# Patient Record
Sex: MALE | Race: WHITE | HISPANIC OR LATINO | Employment: OTHER | URBAN - METROPOLITAN AREA
[De-identification: names, ages, dates, MRNs, and addresses within clinical notes are randomized per-mention and may not be internally consistent; named-entity substitution may affect disease eponyms.]

---

## 2017-01-02 RX ORDER — LEVOTHYROXINE SODIUM 88 UG/1
TABLET ORAL
Qty: 30 TABLET | Refills: 12 | Status: SHIPPED | OUTPATIENT
Start: 2017-01-02 | End: 2017-05-16

## 2017-01-30 ENCOUNTER — OFFICE VISIT (OUTPATIENT)
Dept: OTOLARYNGOLOGY | Facility: CLINIC | Age: 77
End: 2017-01-30
Payer: MEDICARE

## 2017-01-30 VITALS
WEIGHT: 226.63 LBS | DIASTOLIC BLOOD PRESSURE: 79 MMHG | SYSTOLIC BLOOD PRESSURE: 134 MMHG | TEMPERATURE: 98 F | BODY MASS INDEX: 33.96 KG/M2 | HEART RATE: 72 BPM

## 2017-01-30 DIAGNOSIS — R04.0 RECURRENT EPISTAXIS: Primary | ICD-10-CM

## 2017-01-30 PROCEDURE — 99214 OFFICE O/P EST MOD 30 MIN: CPT | Mod: S$GLB,,, | Performed by: OTOLARYNGOLOGY

## 2017-01-30 PROCEDURE — 3078F DIAST BP <80 MM HG: CPT | Mod: S$GLB,,, | Performed by: OTOLARYNGOLOGY

## 2017-01-30 PROCEDURE — 3075F SYST BP GE 130 - 139MM HG: CPT | Mod: S$GLB,,, | Performed by: OTOLARYNGOLOGY

## 2017-01-30 PROCEDURE — 1157F ADVNC CARE PLAN IN RCRD: CPT | Mod: S$GLB,,, | Performed by: OTOLARYNGOLOGY

## 2017-01-30 PROCEDURE — 99999 PR PBB SHADOW E&M-EST. PATIENT-LVL III: CPT | Mod: PBBFAC,,, | Performed by: OTOLARYNGOLOGY

## 2017-01-30 PROCEDURE — 1159F MED LIST DOCD IN RCRD: CPT | Mod: S$GLB,,, | Performed by: OTOLARYNGOLOGY

## 2017-01-30 PROCEDURE — 1160F RVW MEDS BY RX/DR IN RCRD: CPT | Mod: S$GLB,,, | Performed by: OTOLARYNGOLOGY

## 2017-01-30 PROCEDURE — 1126F AMNT PAIN NOTED NONE PRSNT: CPT | Mod: S$GLB,,, | Performed by: OTOLARYNGOLOGY

## 2017-01-30 RX ORDER — SULFAMETHOXAZOLE AND TRIMETHOPRIM 800; 160 MG/1; MG/1
1 TABLET ORAL 2 TIMES DAILY
Qty: 20 TABLET | Refills: 0 | Status: SHIPPED | OUTPATIENT
Start: 2017-01-30 | End: 2017-02-13

## 2017-01-30 NOTE — PATIENT INSTRUCTIONS
PLEASE PERFORM SINUS RINSES 3-4 TIMES DAILY UNTIL YOUR NEXT VISIT.          DIRECTIONS FOR SINUS SALINE RINSE To see demonstration: Enter http://www.youtube.com/watch?[email or web address] into the browser address box, or go to You tube, and under the search box, enter sinus rinse. Click on [employer] Sinus Rinse Video    Step 1    Step 1 Please wash your hands. Fill the clean bottle with the designated volume of warm distilled water, filtered water or previously boiled water. You may warm the water in a microwave but we recommend that you warm it in increments of five seconds. This is to avoid excessive heating of the water and damage to the device or scalding your nasal passage.    Step 2    Step 2 Cut the SINUS RINSE mixture packet at the corner and pour its contents into the bottle. Tighten the cap & tube on the bottle securely, place one finger over the tip of the cap and shake the bottle gently to dissolve the mixture.      Step 3    Step 3 Standing in front of a sink, bend forward to your comfort level and tilt your head down. Keeping your mouth open without holding your breath, place cap snugly against your nasal passage and SQUEEZE BOTTLE GENTLY until the solution starts draining from the OPPOSITE nasal passage or from your mouth. Keep squeezing the bottle GENTLY until at least 1/4 to 1/2 (60 to 120 mL) of the bottle is used for a proper rinse. Do not swallow the solution.    Step 4    Blow your nose gently, without pinching your nose completely because this will apply pressure on the    eardrums. If tolerable, sniff in any residual solution remaining in the nasal passage once or twice prior to    blowing your nose as this may clean out the posterior nasopharyngeal area (the area at the back of your    nasal passage). Some solution will reach the back of the throat, so please spit it out. To help improve    drainage of any residual solution, blow your nose gently while tilting your head to the opposite side  of    the nasal passage that you just rinsed.    Step 5    Now repeat steps 3 & 4 for your other nasal passage.    Step 6 Air dry the Sinus Rinse bottle, cap, and tube on a clean paper towel, a glass plate to store the bottle cap and tube. If there is any solution leftover, please discard it. We recommend you make a fresh solution each time you rinse. Rinse 5 times each day, OR as directed by your physician. Warnings: DO NOT RINSE IF NASAL PASSAGE IS COMPLETELY BLOCKED OR IF YOU HAVE AN EAR INFECTION OR BLOCKED EARS. If you have had ear surgery, please contact your physician prior to irrigation. If you experience any pressure in your ears, stop the rinse and get further directions from your physician or contact our office during regular business hours. To avoid any ear discomfort: Heat the solution to lukewarm, do not use hot, boiling or cold water. Keep your mouth open. Do not hold your breath and if possible make the sound RADHA....RADHA... Make sure to take the position as shown. Gently squeeze 1/4 of the bottle at a time (60mL / 2 ounces). Stop the rinse if you feel any solution sensation near the ears. You may rinse with a partially blocked nasal passage. Please do not use for any other purposes. Please rinse at least ONE HOUR PRIOR to bedtime, in order to avoid any residual solution dripping in the throat.    >> Before using the SINUS RINSE kit, please inspect the cap, tube and bottle carefully for wear and    tear. If any of the components appear discolored or cracked, please contact [employer] to obtain a    replacement. You must follow the cleaning instructions provided in this brochure or cleaning instruction    card prior to each use.    >> The SINUS RINSE bottle and mixture are to be used only for nasalirrigation. Do not use for any other    purposes.    >> We recommend that you use the rinse ONE HOUR PRIOR to bedtime in order to avoid any residual    solution dripping in the throat.    Tips to avoid ear  discomfort while rinsing    If you have had ear surgery, please contact your physician prior to irrigation. Do not use if you have an    ear infection or blocked ears. Rinse with lukewarm water. Keep your mouth open. Do not hold your    breath while rinsing. While rinsing, make sure to tilt your. Gently squeeze the bottle while rinsing; do    not squeeze the bottle very forcefully. Stop the rinse if you feel a sensation of fluid near your ears.    Tips to avoid unexpected drainage after rinsing    In rare situations, especially if you have had sinus surgery, the saline solution can pool in the sinus    cavities and nasal passages and then drip from your nostrils hours after rinsing. To avoid this harmless    but annoying inconvenience, take one extra step after rinsing: lean forward, tilt your head sideways and    gently blow your nose. Then, tilt your head to the other side and blow again. You may need to repeat    this several times. This will help rid your nasal passages of any excess mucus and remaining saline    solution. If you find yourself experiencing delayed drainage often, do not rinse right before leaving your    house or going to bed.

## 2017-01-30 NOTE — MR AVS SNAPSHOT
[facility]  [address] [place] [address]  Phone: [phone]  Fax: [phone]                  Jose Manuel Ford Jr.   2017 1:30 PM   Office Visit    Description:  Male : 1940   Provider:  Ortiz Stubbs MD   Department:  [facility] - ENT           Reason for Visit     Follow-up     Epistaxis                To Do List           Future Appointments        Provider Department Dept Phone    2017 1:15 PM Ortiz Stubbs MD [facility] - [phone]    3/9/2017 10:10 AM LABORATORY, [address] [facility] [phone]    5/15/2017 10:00 AM Itz Welch MD [facility] [phone]    2017 10:30 AM LABORATORY, [address] [facility] [phone]    2017 8:40 AM Gilles Nelson IV, MD [facility] [phone]      Goals (5 Years of Data)     None       These Medications        Disp Refills Start End    sulfamethoxazole-trimethoprim 800-160mg (BACTRIM DS) 800-160 mg Tab 20 tablet 0 2017    Take 1 tablet by mouth 2 (two) times daily. - Oral    Pharmacy: [facility] [address] [place] [address] Ph #: [phone]         [facility]     [facility] -  Assistance  Registered nurses in the [facility] provide clinical advisement, health education, appointment booking, and other advisory services.  Call for this free service at [phone].             Medications           Message regarding Medications     Verify the changes and/or additions to your medication regime listed below are the same as discussed with your clinician today.  If any of these changes or additions are incorrect, please notify your healthcare provider.        START taking these NEW medications        Refills    sulfamethoxazole-trimethoprim 800-160mg (BACTRIM DS) 800-160 mg Tab 0    Sig: Take 1 tablet by mouth 2 (two) times daily.    Class:  Normal    Route: Oral           Verify that the below list of medications is an accurate representation of the medications you are currently taking.  If none reported, the list may be blank. If incorrect, please contact your healthcare provider. Carry this list with you in case of emergency.           Current Medications     amlodipine (NORVASC) 5 MG tablet Take 1 tablet (5 mg total) by mouth once daily.    atorvastatin (LIPITOR) 20 MG tablet TAKE 1 TABLET BY MOUTH EVERY DAY    levothyroxine (SYNTHROID) 88 MCG tablet Take 1 tablet (88 mcg total) by mouth once daily.    omeprazole (PRILOSEC) 20 MG capsule Take 1 capsule (20 mg total) by mouth once daily.    SYNTHROID 88 mcg tablet TAKE 1 TABLET BY MOUTH EVERY DAY    sulfamethoxazole-trimethoprim 800-160mg (BACTRIM DS) 800-160 mg Tab Take 1 tablet by mouth 2 (two) times daily.           Clinical Reference Information           Vital Signs - Last Recorded  Most recent update: 1/30/2017  1:39 PM by Claritza Naylor MA    BP Pulse Temp Wt BMI    134/79 72 98.3 °F (36.8 °C) (Tympanic) 102.8 kg (226 lb 10.1 oz) 33.96 kg/m2      Blood Pressure          Most Recent Value    BP  134/79      Allergies as of 1/30/2017     No Known Allergies      Immunizations Administered on Date of Encounter - 1/30/2017     None      Instructions    PLEASE PERFORM SINUS RINSES 3-4 TIMES DAILY UNTIL YOUR NEXT VISIT.          DIRECTIONS FOR SINUS SALINE RINSE To see demonstration: Enter [email or web address]/[email or web address] into the browser address box, or go to You tube, and under the search box, enter sinus rinse. Click on [employer] Sinus Rinse Video    Step 1    Step 1 Please wash your hands. Fill the clean bottle with the designated volume of warm distilled water, filtered water or previously boiled water. You may warm the water in a microwave but we recommend that you warm it in increments of five seconds. This is to avoid excessive heating of the water and damage to the device  or scalding your nasal passage.    Step 2    Step 2 Cut the SINUS RINSE mixture packet at the corner and pour its contents into the bottle. Tighten the cap & tube on the bottle securely, place one finger over the tip of the cap and shake the bottle gently to dissolve the mixture.      Step 3    Step 3 Standing in front of a sink, bend forward to your comfort level and tilt your head down. Keeping your mouth open without holding your breath, place cap snugly against your nasal passage and SQUEEZE BOTTLE GENTLY until the solution starts draining from the OPPOSITE nasal passage or from your mouth. Keep squeezing the bottle GENTLY until at least 1/4 to 1/2 (60 to 120 mL) of the bottle is used for a proper rinse. Do not swallow the solution.    Step 4    Blow your nose gently, without pinching your nose completely because this will apply pressure on the    eardrums. If tolerable, sniff in any residual solution remaining in the nasal passage once or twice prior to    blowing your nose as this may clean out the posterior nasopharyngeal area (the area at the back of your    nasal passage). Some solution will reach the back of the throat, so please spit it out. To help improve    drainage of any residual solution, blow your nose gently while tilting your head to the opposite side of    the nasal passage that you just rinsed.    Step 5    Now repeat steps 3 & 4 for your other nasal passage.    Step 6 Air dry the Sinus Rinse bottle, cap, and tube on a clean paper towel, a glass plate to store the bottle cap and tube. If there is any solution leftover, please discard it. We recommend you make a fresh solution each time you rinse. Rinse 5 times each day, OR as directed by your physician. Warnings: DO NOT RINSE IF NASAL PASSAGE IS COMPLETELY BLOCKED OR IF YOU HAVE AN EAR INFECTION OR BLOCKED EARS. If you have had ear surgery, please contact your physician prior to irrigation. If you experience any pressure in your ears, stop the  rinse and get further directions from your physician or contact our office during regular business hours. To avoid any ear discomfort: Heat the solution to lukewarm, do not use hot, boiling or cold water. Keep your mouth open. Do not hold your breath and if possible make the sound RADHA....RADHA... Make sure to take the position as shown. Gently squeeze 1/4 of the bottle at a time (60mL / 2 ounces). Stop the rinse if you feel any solution sensation near the ears. You may rinse with a partially blocked nasal passage. Please do not use for any other purposes. Please rinse at least ONE HOUR PRIOR to bedtime, in order to avoid any residual solution dripping in the throat.    >> Before using the SINUS RINSE kit, please inspect the cap, tube and bottle carefully for wear and    tear. If any of the components appear discolored or cracked, please contact [email or web address] to obtain a    replacement. You must follow the cleaning instructions provided in this brochure or cleaning instruction    card prior to each use.    >> The SINUS RINSE bottle and mixture are to be used only for nasalirrigation. Do not use for any other    purposes.    >> We recommend that you use the rinse ONE HOUR PRIOR to bedtime in order to avoid any residual    solution dripping in the throat.    Tips to avoid ear discomfort while rinsing    If you have had ear surgery, please contact your physician prior to irrigation. Do not use if you have an    ear infection or blocked ears. Rinse with lukewarm water. Keep your mouth open. Do not hold your    breath while rinsing. While rinsing, make sure to tilt your. Gently squeeze the bottle while rinsing; do    not squeeze the bottle very forcefully. Stop the rinse if you feel a sensation of fluid near your ears.    Tips to avoid unexpected drainage after rinsing    In rare situations, especially if you have had sinus surgery, the saline solution can pool in the sinus    cavities and nasal passages and then drip from  your nostrils hours after rinsing. To avoid this harmless    but annoying inconvenience, take one extra step after rinsing: lean forward, tilt your head sideways and    gently blow your nose. Then, tilt your head to the other side and blow again. You may need to repeat    this several times. This will help rid your nasal passages of any excess mucus and remaining saline    solution. If you find yourself experiencing delayed drainage often, do not rinse right before leaving your    house or going to bed.

## 2017-01-30 NOTE — PROGRESS NOTES
Referring Provider:    No referring provider defined for this encounter.  Subjective:   Patient: Jose Manuel Ford Jr. [id number], :1940   Visit date:2017 8:29 PM    Chief Complaint:  Follow-up and Epistaxis    HPI:  Jose Manuel is a 76 y.o. male who I was asked to see in consultation for evaluation of the following issue(s):     Patient was initially seen  then seen again .  At initial presentation, he reported that he had chronic right-sided epistaxis.  This had been occurring at least weekly since the 1970s.  It becomes more severe after upper respiratory infections.  He denied any history of nasal trauma or surgery.  Bleeding was almost always self limited.  At his follow-up visit on , he reported no significant improvement.  I recommended that he use nasal saline spray throughout the day as well as saline gelatin at night.  He had not been doing any of this.  At that point I was able to see a small area of ulceration within the left aspect of the nasal vestibule.  I recommended that he began with the previously recommended regimen to improve moisturization and also placed him on Bactroban ointment.  He will return to clinic 2017.  He reported that the bleeding and actually improved.  He had to bleed since the prior visit but one of these have been extremely small in nature.  Has been using the Bactroban but not using the moisturizing products as recommended.    Review of Systems:  -     Allergic/Immunologic: has No Known Allergies..  -     Constitutional: Current temp: 98.3 °F (36.8 °C) (Tympanic)      His meds, allergies, medical, surgical, social & family histories were reviewed & updated:  -     He has a current medication list which includes the following prescription(s): amlodipine, atorvastatin, levothyroxine, omeprazole, and synthroid.  -     He  has a past medical history of Depression; HTN (hypertension); Hypercholesteremia; Hypothyroidism; Obesity;  and Renal manifestation of secondary diabetes mellitus.   -     He  does not have any pertinent problems on file.   -     He  has no past surgical history on file.  -     He  reports that he has quit smoking. He quit smokeless tobacco use about 49 years ago. He reports that he does not drink alcohol or use illicit drugs.  -     His family history includes COPD in his father; Stroke in his mother. There is no history of Cancer, Hearing loss, or Kidney disease.  -     He has No Known Allergies.    Objective:     Physical Exam:  Vitals:    Visit Vitals    /79    Pulse 72    Temp 98.3 °F (36.8 °C) (Tympanic)    Wt 102.8 kg (226 lb 10.1 oz)    BMI 33.96 kg/m2     General appearance:  Well developed, well nourished    Eyes:  Extraocular motions intact, PERRL    Communication:  no hoarseness, no dysphonia    Ears:  Otoscopy of external auditory canals and tympanic membranes was normal, clinical speech reception thresholds grossly intact, no mass/lesion of auricle.  Nose:  Small excoriation or ulceration on the internal aspect of the right ala.  Mouth:  No mass/lesion of lips, teeth, gums, hard/soft palate, tongue, tonsils, or oropharynx.    Cardiovascular:  No pedal edema; Radial Pulses +2     Neck & Lymphatics:  No cervical lymphadenopathy, no neck mass/crepitus/ asymmetry, trachea is midline, no thyroid enlargement/tenderness/mass.    Psych: Oriented x3,  Alert with normal mood and affect.     Respiration/Chest:  Symmetric expansion during respiration, normal respiratory effort.    Skin:  Warm and intact. No ulcerations of face, scalp, neck.      Assessment & Plan:       I have recommended again that he use all medications and treatments as instructed.  He seems frustrated that this has not resolved but I explained that firstly, this is a problem that has been going on since the 1970's and secondly, he has not been compliant with treatments provided.  He was agreeable at this point.  I discussed that there  could be a component of bacterial infection within the ulceration of the nasal vestibule.  I have added Bactrim.  I instructed him that I would like to see him in 1 month.

## 2017-04-28 ENCOUNTER — PATIENT OUTREACH (OUTPATIENT)
Dept: ADMINISTRATIVE | Facility: HOSPITAL | Age: 77
End: 2017-04-28

## 2017-05-12 ENCOUNTER — PATIENT OUTREACH (OUTPATIENT)
Dept: ADMINISTRATIVE | Facility: HOSPITAL | Age: 77
End: 2017-05-12

## 2017-05-15 ENCOUNTER — LAB VISIT (OUTPATIENT)
Dept: LAB | Facility: HOSPITAL | Age: 77
End: 2017-05-15
Payer: MEDICARE

## 2017-05-15 ENCOUNTER — OFFICE VISIT (OUTPATIENT)
Dept: FAMILY MEDICINE | Facility: CLINIC | Age: 77
End: 2017-05-15
Payer: MEDICARE

## 2017-05-15 VITALS
TEMPERATURE: 97 F | DIASTOLIC BLOOD PRESSURE: 74 MMHG | HEIGHT: 69 IN | WEIGHT: 235 LBS | RESPIRATION RATE: 18 BRPM | SYSTOLIC BLOOD PRESSURE: 128 MMHG | BODY MASS INDEX: 34.8 KG/M2 | OXYGEN SATURATION: 97 % | HEART RATE: 76 BPM

## 2017-05-15 DIAGNOSIS — K21.9 GASTROESOPHAGEAL REFLUX DISEASE WITHOUT ESOPHAGITIS: ICD-10-CM

## 2017-05-15 DIAGNOSIS — E03.4 HYPOTHYROIDISM DUE TO ACQUIRED ATROPHY OF THYROID: ICD-10-CM

## 2017-05-15 DIAGNOSIS — R97.20 ELEVATED PSA: ICD-10-CM

## 2017-05-15 DIAGNOSIS — I10 ESSENTIAL HYPERTENSION: ICD-10-CM

## 2017-05-15 DIAGNOSIS — R74.8 ELEVATED ALKALINE PHOSPHATASE LEVEL: ICD-10-CM

## 2017-05-15 DIAGNOSIS — K76.0 FATTY LIVER DISEASE, NONALCOHOLIC: ICD-10-CM

## 2017-05-15 DIAGNOSIS — E78.00 HYPERCHOLESTEREMIA: ICD-10-CM

## 2017-05-15 DIAGNOSIS — R89.9 ABNORMAL LABORATORY TEST: ICD-10-CM

## 2017-05-15 DIAGNOSIS — I10 ESSENTIAL HYPERTENSION: Primary | ICD-10-CM

## 2017-05-15 LAB
ALBUMIN SERPL BCP-MCNC: 4.1 G/DL
ALP SERPL-CCNC: 160 U/L
ALP SERPL-CCNC: 160 U/L
ALT SERPL W/O P-5'-P-CCNC: 25 U/L
ANION GAP SERPL CALC-SCNC: 12 MMOL/L
AST SERPL-CCNC: 23 U/L
BILIRUB SERPL-MCNC: 0.9 MG/DL
BUN SERPL-MCNC: 16 MG/DL
CALCIUM SERPL-MCNC: 9.5 MG/DL
CHLORIDE SERPL-SCNC: 105 MMOL/L
CHOLEST/HDLC SERPL: 4.5 {RATIO}
CO2 SERPL-SCNC: 23 MMOL/L
CREAT SERPL-MCNC: 1.2 MG/DL
EST. GFR  (AFRICAN AMERICAN): >60 ML/MIN/1.73 M^2
EST. GFR  (NON AFRICAN AMERICAN): 58.8 ML/MIN/1.73 M^2
GLUCOSE SERPL-MCNC: 99 MG/DL
HDL/CHOLESTEROL RATIO: 22.4 %
HDLC SERPL-MCNC: 196 MG/DL
HDLC SERPL-MCNC: 44 MG/DL
LDLC SERPL CALC-MCNC: 108.2 MG/DL
NONHDLC SERPL-MCNC: 152 MG/DL
POTASSIUM SERPL-SCNC: 4.7 MMOL/L
PROT SERPL-MCNC: 7.6 G/DL
SODIUM SERPL-SCNC: 140 MMOL/L
T4 FREE SERPL-MCNC: 0.92 NG/DL
TRIGL SERPL-MCNC: 219 MG/DL
TSH SERPL DL<=0.005 MIU/L-ACNC: 6.27 UIU/ML

## 2017-05-15 PROCEDURE — 36415 COLL VENOUS BLD VENIPUNCTURE: CPT | Mod: PO

## 2017-05-15 PROCEDURE — 80053 COMPREHEN METABOLIC PANEL: CPT

## 2017-05-15 PROCEDURE — 84439 ASSAY OF FREE THYROXINE: CPT

## 2017-05-15 PROCEDURE — 1160F RVW MEDS BY RX/DR IN RCRD: CPT | Mod: S$GLB,,, | Performed by: INTERNAL MEDICINE

## 2017-05-15 PROCEDURE — 1159F MED LIST DOCD IN RCRD: CPT | Mod: S$GLB,,, | Performed by: INTERNAL MEDICINE

## 2017-05-15 PROCEDURE — 3078F DIAST BP <80 MM HG: CPT | Mod: S$GLB,,, | Performed by: INTERNAL MEDICINE

## 2017-05-15 PROCEDURE — 84443 ASSAY THYROID STIM HORMONE: CPT

## 2017-05-15 PROCEDURE — 99999 PR PBB SHADOW E&M-EST. PATIENT-LVL III: CPT | Mod: PBBFAC,,, | Performed by: INTERNAL MEDICINE

## 2017-05-15 PROCEDURE — 99215 OFFICE O/P EST HI 40 MIN: CPT | Mod: S$GLB,,, | Performed by: INTERNAL MEDICINE

## 2017-05-15 PROCEDURE — 80061 LIPID PANEL: CPT

## 2017-05-15 PROCEDURE — 1126F AMNT PAIN NOTED NONE PRSNT: CPT | Mod: S$GLB,,, | Performed by: INTERNAL MEDICINE

## 2017-05-15 PROCEDURE — 3074F SYST BP LT 130 MM HG: CPT | Mod: S$GLB,,, | Performed by: INTERNAL MEDICINE

## 2017-05-15 PROCEDURE — 99499 UNLISTED E&M SERVICE: CPT | Mod: S$GLB,,, | Performed by: INTERNAL MEDICINE

## 2017-05-15 NOTE — PROGRESS NOTES
Subjective:       Patient ID: Jose Manuel Ford Jr. is a 76 y.o. male.    Chief Complaint: Follow-up; Hypertension; Hyperlipidemia; Gastroesophageal Reflux; and Hypothyroidism    Hypertension   Pertinent negatives include no chest pain, headaches, neck pain, palpitations or shortness of breath. Past treatments include calcium channel blockers. The current treatment provides significant improvement.   Hyperlipidemia   Pertinent negatives include no chest pain, myalgias or shortness of breath. Current antihyperlipidemic treatment includes statins.   Gastroesophageal Reflux   He reports no abdominal pain, no chest pain, no choking, no coughing, no nausea, no sore throat or no wheezing. Pertinent negatives include no fatigue. He has tried a PPI for the symptoms. The treatment provided significant relief.     Review of Systems   Constitutional: Negative for activity change, appetite change, chills, diaphoresis, fatigue, fever and unexpected weight change.   HENT: Negative for drooling, ear discharge, ear pain, facial swelling, hearing loss, mouth sores, nosebleeds, postnasal drip, rhinorrhea, sinus pressure, sneezing, sore throat, tinnitus, trouble swallowing and voice change.    Eyes: Negative for photophobia, redness and visual disturbance.   Respiratory: Negative for apnea, cough, choking, chest tightness, shortness of breath and wheezing.    Cardiovascular: Negative for chest pain, palpitations and leg swelling.   Gastrointestinal: Negative for abdominal distention, abdominal pain, anal bleeding, blood in stool, constipation, diarrhea, nausea and vomiting.   Endocrine: Negative for cold intolerance, heat intolerance, polydipsia, polyphagia and polyuria.   Genitourinary: Negative for difficulty urinating, dysuria, enuresis, flank pain, frequency, genital sores, hematuria and urgency.   Musculoskeletal: Negative for arthralgias, back pain, gait problem, joint swelling, myalgias, neck pain and neck stiffness.   Skin:  Negative for color change, pallor, rash and wound.   Allergic/Immunologic: Negative for food allergies and immunocompromised state.   Neurological: Negative for dizziness, tremors, seizures, syncope, facial asymmetry, speech difficulty, weakness, light-headedness, numbness and headaches.   Hematological: Negative for adenopathy. Does not bruise/bleed easily.   Psychiatric/Behavioral: Negative for agitation, behavioral problems, confusion, decreased concentration, dysphoric mood, hallucinations, self-injury, sleep disturbance and suicidal ideas. The patient is not nervous/anxious and is not hyperactive.        Objective:      Physical Exam   Constitutional: He is oriented to person, place, and time. He appears well-developed and well-nourished. No distress.   HENT:   Head: Normocephalic and atraumatic.   Eyes: Pupils are equal, round, and reactive to light. No scleral icterus.   Neck: Normal range of motion. Neck supple. No JVD present. Carotid bruit is not present. No tracheal deviation present. No thyromegaly present.   Cardiovascular: Normal rate, regular rhythm, normal heart sounds and intact distal pulses.    Pulmonary/Chest: Effort normal and breath sounds normal. No respiratory distress. He has no wheezes. He has no rales. He exhibits no tenderness.   Abdominal: Soft. Bowel sounds are normal. He exhibits no distension. There is no tenderness. There is no rebound and no guarding.   Musculoskeletal: Normal range of motion. He exhibits no edema or tenderness.   Lymphadenopathy:     He has no cervical adenopathy.   Neurological: He is alert and oriented to person, place, and time. No cranial nerve deficit. Coordination normal.   Skin: Skin is warm and dry. No rash noted. He is not diaphoretic. No erythema. No pallor.   Psychiatric: He has a normal mood and affect. His behavior is normal. Judgment and thought content normal.   Nursing note and vitals reviewed.      Assessment:       1. Essential hypertension    2.  Gastroesophageal reflux disease without esophagitis    3. Hypercholesteremia    4. Hypothyroidism due to acquired atrophy of thyroid    5. Fatty liver disease, nonalcoholic    6. Elevated PSA    7. Elevated alkaline phosphatase level        Plan:        stable-----------continue meds,   Notes/labs reviewed.          Check cmp,lipids,tsh.           F/u prn or 6 month.s

## 2017-05-15 NOTE — MR AVS SNAPSHOT
[facility]  [address] [place] [address]  Phone: [phone]                  Jose Manuel Ford Jr.   5/15/2017 10:00 AM   Office Visit    Description:  Male : 1940   Provider:  Itz Welch MD   Department:  [facility]           Reason for Visit     Follow-up     Hypertension     Hyperlipidemia     Gastroesophageal Reflux     Hypothyroidism           Diagnoses this Visit        Comments    Essential hypertension    -  Primary     Gastroesophageal reflux disease without esophagitis         Hypercholesteremia         Hypothyroidism due to acquired atrophy of thyroid         Fatty liver disease, nonalcoholic         Elevated PSA         Elevated alkaline phosphatase level                To Do List           Future Appointments        Provider Department Dept Phone    5/15/2017 11:20 AM LABORATORY, [address] [facility] [phone]    2017 10:30 AM LABORATORY, [address] [facility] [phone]    2017 8:40 AM [facility] [phone]    11/15/2017 9:30 AM Itz Welch MD [facility] [phone]      Goals (5 Years of Data)     None      Follow-Up and Disposition     Return in about 6 months (around 11/15/2017).      [facility]     [facility] -  Assistance  Unless otherwise directed by your provider, please contact [facility], our nurse care line that is available for  assistance.     Registered nurses in the [facility] provide: appointment scheduling, clinical advisement, health education, and other advisory services.  Call: [phone] (toll free)               Medications           Message regarding Medications     Verify the changes and/or additions to your medication regime listed below are the same as discussed with your clinician today.  If any of these  "changes or additions are incorrect, please notify your healthcare provider.             Verify that the below list of medications is an accurate representation of the medications you are currently taking.  If none reported, the list may be blank. If incorrect, please contact your healthcare provider. Carry this list with you in case of emergency.           Current Medications     amlodipine (NORVASC) 5 MG tablet Take 1 tablet (5 mg total) by mouth once daily.    atorvastatin (LIPITOR) 20 MG tablet TAKE 1 TABLET BY MOUTH EVERY DAY    levothyroxine (SYNTHROID) 88 MCG tablet Take 1 tablet (88 mcg total) by mouth once daily.    omeprazole (PRILOSEC) 20 MG capsule Take 1 capsule (20 mg total) by mouth once daily.    SYNTHROID 88 mcg tablet TAKE 1 TABLET BY MOUTH EVERY DAY           Clinical Reference Information           Your Vitals Were     BP Pulse Temp Resp    128/74 (BP Location: Left arm, Patient Position: Sitting, BP Method: Manual) 76 97.2 °F (36.2 °C) (Tympanic) 18    Height Weight SpO2 BMI    5' 8.5" (1.74 m) 106.6 kg (235 lb 0.2 oz) 97% 35.21 kg/m2      Blood Pressure          Most Recent Value    BP  128/74      Allergies as of 5/15/2017     No Known Allergies      Immunizations Administered on Date of Encounter - 5/15/2017     None      Orders Placed During Today's Visit     Future Labs/Procedures Expected by Expires    Comprehensive metabolic panel  5/15/2017 7/14/2018    Lipid panel  5/15/2017 7/14/2018    TSH  5/15/2017 7/14/2018      Language Assistance Services     ATTENTION: Language assistance services are available, free of charge. Please call [phone].      ATENCIÓN: Si habla español, tiene a palmer disposición servicios gratuitos de asistencia lingüística. Llame al [phone].     CHÚ Ý: N?u b?n nói Ti?ng Vi?t, có các d?ch v? h? tr? ngôn ng? mi?n phí dành cho b?n. G?i s? [phone].         [facility] complies with applicable Federal civil rights laws and does not " discriminate on the basis of race, color, national origin, age, disability, or sex.

## 2017-05-15 NOTE — PROGRESS NOTES
Patient, Jose Manuel Ford Jr. (MRN #[id number]), presented with a recorded BMI of 35.21 kg/m^2 and a documented comorbidity(s):  - Hypertension  - Hyperlipidemia  to which the severe obesity is a contributing factor. This is consistent with the definition of severe obesity (BMI 35.0-35.9) with comorbidity (ICD-10 E66.01, Z68.35). The patient's severe obesity was monitored, evaluated, addressed and/or treated. This addendum to the medical record is made on 05/15/2017.

## 2017-05-16 ENCOUNTER — TELEPHONE (OUTPATIENT)
Dept: FAMILY MEDICINE | Facility: CLINIC | Age: 77
End: 2017-05-16

## 2017-05-16 DIAGNOSIS — E03.9 HYPOTHYROIDISM, UNSPECIFIED TYPE: Primary | ICD-10-CM

## 2017-05-16 RX ORDER — LEVOTHYROXINE SODIUM 100 UG/1
100 TABLET ORAL DAILY
Qty: 30 TABLET | Refills: 11 | Status: SHIPPED | OUTPATIENT
Start: 2017-05-16 | End: 2017-05-16

## 2017-05-16 RX ORDER — LEVOTHYROXINE SODIUM 88 UG/1
88 TABLET ORAL DAILY
Qty: 30 TABLET | Refills: 11 | Status: SHIPPED | OUTPATIENT
Start: 2017-05-16 | End: 2017-06-05

## 2017-05-16 NOTE — TELEPHONE ENCOUNTER
Spoke with pt, aware of results, Expressed understanding. Pt states he's not going to take that medicine everyday, because it makes him sick as a dog and there is no point in him coming in to do the labs. Unless, you can recommend another medication that doesn't make him sick.

## 2017-05-16 NOTE — TELEPHONE ENCOUNTER
Spoke with pt, aware of results, Expressed understanding. States he don't take his synthroid daily, but every other day. States the medicine gives him a URI. States he spoke with you about it, so before he buys it, he need consult with you about this.

## 2017-06-01 ENCOUNTER — PATIENT MESSAGE (OUTPATIENT)
Dept: FAMILY MEDICINE | Facility: CLINIC | Age: 77
End: 2017-06-01

## 2017-06-05 ENCOUNTER — OFFICE VISIT (OUTPATIENT)
Dept: FAMILY MEDICINE | Facility: CLINIC | Age: 77
End: 2017-06-05
Payer: MEDICARE

## 2017-06-05 VITALS
HEIGHT: 69 IN | RESPIRATION RATE: 18 BRPM | WEIGHT: 236.13 LBS | HEART RATE: 92 BPM | DIASTOLIC BLOOD PRESSURE: 60 MMHG | SYSTOLIC BLOOD PRESSURE: 120 MMHG | OXYGEN SATURATION: 97 % | TEMPERATURE: 97 F | BODY MASS INDEX: 34.97 KG/M2

## 2017-06-05 DIAGNOSIS — E03.4 HYPOTHYROIDISM DUE TO ACQUIRED ATROPHY OF THYROID: Primary | ICD-10-CM

## 2017-06-05 PROCEDURE — 99214 OFFICE O/P EST MOD 30 MIN: CPT | Mod: S$GLB,,, | Performed by: INTERNAL MEDICINE

## 2017-06-05 PROCEDURE — 99499 UNLISTED E&M SERVICE: CPT | Mod: S$GLB,,, | Performed by: INTERNAL MEDICINE

## 2017-06-05 PROCEDURE — 99999 PR PBB SHADOW E&M-EST. PATIENT-LVL III: CPT | Mod: PBBFAC,,, | Performed by: INTERNAL MEDICINE

## 2017-06-05 PROCEDURE — 1126F AMNT PAIN NOTED NONE PRSNT: CPT | Mod: S$GLB,,, | Performed by: INTERNAL MEDICINE

## 2017-06-05 PROCEDURE — 1159F MED LIST DOCD IN RCRD: CPT | Mod: S$GLB,,, | Performed by: INTERNAL MEDICINE

## 2017-06-05 RX ORDER — THYROID 15 MG/1
1 TABLET ORAL DAILY
Qty: 30 TABLET | Refills: 11 | Status: SHIPPED | OUTPATIENT
Start: 2017-06-05 | End: 2018-10-30

## 2017-06-05 NOTE — PROGRESS NOTES
Subjective:       Patient ID: Jose Manuel Ford Jr. is a 76 y.o. male.    Chief Complaint: Consult  -----------has side effects from synthroid including hoarseness / hair loss/ URI,        Wants off------------would like to try armour thyroid instead.         HPI  Review of Systems   Constitutional: Negative for fever.   HENT: Positive for congestion and voice change. Negative for sore throat and trouble swallowing.    Respiratory: Positive for wheezing. Negative for apnea, cough, choking, chest tightness, shortness of breath and stridor.    Cardiovascular: Negative for chest pain, palpitations and leg swelling.   Gastrointestinal: Negative for abdominal pain, nausea and vomiting.   Genitourinary: Negative.    Neurological: Negative for tremors, syncope, facial asymmetry, speech difficulty, weakness and numbness.   Psychiatric/Behavioral: Negative for agitation, behavioral problems and confusion.       Objective:      Physical Exam   Constitutional: He is oriented to person, place, and time. He appears well-developed and well-nourished. No distress.   HENT:   Head: Normocephalic and atraumatic.   Mouth/Throat: No oropharyngeal exudate.   Eyes: Pupils are equal, round, and reactive to light.   Neck: Normal range of motion. Neck supple. Carotid bruit is not present.   Cardiovascular: Normal rate, regular rhythm, normal heart sounds and intact distal pulses.    Pulmonary/Chest: Effort normal and breath sounds normal. No respiratory distress. He has no wheezes. He has no rales. He exhibits no tenderness.   Abdominal: Soft. Bowel sounds are normal. There is no guarding.   Musculoskeletal: Normal range of motion. He exhibits no edema or tenderness.   Neurological: He is alert and oriented to person, place, and time.   Skin: Skin is warm and dry. No rash noted. He is not diaphoretic. No erythema. No pallor.   Psychiatric: He has a normal mood and affect. His behavior is normal. Judgment and thought content normal.    Nursing note and vitals reviewed.      Assessment:       1. Hypothyroidism due to acquired atrophy of thyroid        Plan:         change synthroid to armour thyroid 15 mg a day-follow.          F/u 1 month--------agrees to see ent then if still with hoarseness

## 2017-06-09 ENCOUNTER — OFFICE VISIT (OUTPATIENT)
Dept: FAMILY MEDICINE | Facility: CLINIC | Age: 77
End: 2017-06-09
Payer: MEDICARE

## 2017-06-09 ENCOUNTER — PATIENT MESSAGE (OUTPATIENT)
Dept: FAMILY MEDICINE | Facility: CLINIC | Age: 77
End: 2017-06-09

## 2017-06-09 VITALS
HEART RATE: 89 BPM | DIASTOLIC BLOOD PRESSURE: 80 MMHG | TEMPERATURE: 99 F | HEIGHT: 69 IN | RESPIRATION RATE: 18 BRPM | SYSTOLIC BLOOD PRESSURE: 150 MMHG | WEIGHT: 233.94 LBS | BODY MASS INDEX: 34.65 KG/M2

## 2017-06-09 DIAGNOSIS — J01.91 ACUTE RECURRENT SINUSITIS, UNSPECIFIED LOCATION: Primary | ICD-10-CM

## 2017-06-09 DIAGNOSIS — R05.9 COUGH: ICD-10-CM

## 2017-06-09 PROCEDURE — 99214 OFFICE O/P EST MOD 30 MIN: CPT | Mod: S$GLB,,, | Performed by: FAMILY MEDICINE

## 2017-06-09 PROCEDURE — 99999 PR PBB SHADOW E&M-EST. PATIENT-LVL III: CPT | Mod: PBBFAC,,, | Performed by: FAMILY MEDICINE

## 2017-06-09 PROCEDURE — 1126F AMNT PAIN NOTED NONE PRSNT: CPT | Mod: S$GLB,,, | Performed by: FAMILY MEDICINE

## 2017-06-09 PROCEDURE — 1159F MED LIST DOCD IN RCRD: CPT | Mod: S$GLB,,, | Performed by: FAMILY MEDICINE

## 2017-06-09 RX ORDER — AMOXICILLIN AND CLAVULANATE POTASSIUM 875; 125 MG/1; MG/1
1 TABLET, FILM COATED ORAL EVERY 12 HOURS
Qty: 14 TABLET | Refills: 0 | Status: SHIPPED | OUTPATIENT
Start: 2017-06-09 | End: 2017-06-16

## 2017-06-09 RX ORDER — HYDROCODONE BITARTRATE AND HOMATROPINE METHYLBROMIDE ORAL SOLUTION 5; 1.5 MG/5ML; MG/5ML
5 LIQUID ORAL
Qty: 120 ML | Refills: 0 | Status: SHIPPED | OUTPATIENT
Start: 2017-06-09 | End: 2017-08-31

## 2017-06-09 NOTE — PATIENT INSTRUCTIONS
Self-Care for Sinusitis     Drinking plenty of water can help sinuses drain.   Sinusitis can often be managed with self-care. Self-care can keep sinuses moist and make you feel more comfortable. Remember to follow your doctor's instructions closely. This can make a big difference in getting your sinus problem under control.  Drink fluids  Drinking extra fluids helps thin your mucus. This lets it drain from your sinuses more easily. Have a glass of water every hour or two. A humidifier helps in much the same way. Fluids can also offset the drying effects of certain medicines. If you use a humidifier, follow the product maker's instructions on how to use it. Clean it on a regular schedule.  Use saltwater rinses  Rinses help keep your sinuses and nose moist. Mix a teaspoon of salt in 8 ounces of fresh, warm water. Use a bulb syringe to gently squirt the water into your nose a few times a day. You can also buy ready-made saline nasal sprays.  Apply hot or cold packs  Applying heat to the area surrounding your sinuses may make you feel more comfortable. Use a hot water bottle or a hand towel dipped in hot water. Some people also find ice packs effective for relieving pain.  Medicines  Your doctor may prescribe medications to help treat your sinusitis. If you have an infection, antibiotics can help clear it up. If you are prescribed antibiotics, take all pills on schedule until they are gone, even if you feel better. Decongestants help relieve swelling. Use decongestant sprays for short periods only under the direction of your doctor. If you have allergies, your doctor may prescribe medications to help relieve them.   Date Last Reviewed: 10/1/2016  © 7657-7846 [employer]. [address] [place] [id number]. All rights reserved. This information is not intended as a substitute for professional medical care. Always follow your healthcare professional's instructions.

## 2017-06-09 NOTE — PROGRESS NOTES
Subjective:       Patient ID: Jose Manuel Ford Jr. is a 76 y.o. male.    Chief Complaint: Cough and Sore Throat      HPI   Mr. Ford presents to clinic today for complaints of sore throat. He states it has been going on for a few months and it is not getting better.   He states he has tried over the counter cough medicine.   He states his tonsils hurt.   He states he has felt feverish as well.   He does not smoke.       Review of Systems   Constitutional: Positive for appetite change, chills and fever. Negative for diaphoresis.   HENT: Positive for congestion, sinus pressure, sneezing and sore throat. Negative for rhinorrhea.    Respiratory: Positive for cough. Negative for shortness of breath.    Cardiovascular: Negative for chest pain.   Gastrointestinal: Negative for abdominal pain, nausea and vomiting.       Medication List with Changes/Refills   Current Medications    AMLODIPINE (NORVASC) 5 MG TABLET    Take 1 tablet (5 mg total) by mouth once daily.    ATORVASTATIN (LIPITOR) 20 MG TABLET    TAKE 1 TABLET BY MOUTH EVERY DAY    OMEPRAZOLE (PRILOSEC) 20 MG CAPSULE    Take 1 capsule (20 mg total) by mouth once daily.    THYROID, PORK, 15 MG TAB    Take 1 tablet (15 mg total) by mouth once daily at 6am.       Patient Active Problem List   Diagnosis    HTN (hypertension)    Hypercholesteremia    Hypothyroidism    Elevated PSA    Gastroesophageal reflux disease without esophagitis    Fatty liver disease, nonalcoholic    Chronic kidney disease, stage III (moderate)    BPH (benign prostatic hyperplasia)    Erectile dysfunction    Elevated alkaline phosphatase level    Obesity (BMI 30.0-34.9)         Objective:     Physical Exam   Constitutional: He is oriented to person, place, and time. He appears well-developed and well-nourished. No distress.   HENT:   Head: Normocephalic and atraumatic.   Right Ear: External ear normal.   Left Ear: External ear normal.   Mouth/Throat: Oropharynx is clear and moist. No  oropharyngeal exudate.   Tenderness on palpation of sinuses    Eyes: EOM are normal. Right eye exhibits no discharge. Left eye exhibits no discharge.   Cardiovascular: Normal rate and regular rhythm.    Pulmonary/Chest: Effort normal and breath sounds normal. No respiratory distress. He has no wheezes.   Musculoskeletal: He exhibits no edema.   Neurological: He is alert and oriented to person, place, and time.   Skin: Skin is warm and dry. He is not diaphoretic. No erythema.   Psychiatric: He has a normal mood and affect.   Vitals reviewed.    Vitals:    06/09/17 1103   BP: (!) 150/80   Pulse: 89   Resp: 18   Temp: 99 °F (37.2 °C)       Assessment/  PLAN     Acute recurrent sinusitis, unspecified location  -     amoxicillin-clavulanate 875-125mg (AUGMENTIN) 875-125 mg per tablet; Take 1 tablet by mouth every 12 (twelve) hours.  Dispense: 14 tablet; Refill: 0  - will treat with abx considering it has been going on for so long   - continue supportive care     Cough  -     hydrocodone-homatropine 5-1.5 mg/5 ml (HYCODAN) 5-1.5 mg/5 mL Syrp; Take 5 mLs by mouth every 6 to 8 hours as needed.  Dispense: 120 mL; Refill: 0      Plan as above   rtc prn     Bailey Nieves MD  [facility]

## 2017-08-31 ENCOUNTER — OFFICE VISIT (OUTPATIENT)
Dept: FAMILY MEDICINE | Facility: CLINIC | Age: 77
End: 2017-08-31
Payer: MEDICARE

## 2017-08-31 VITALS
SYSTOLIC BLOOD PRESSURE: 130 MMHG | OXYGEN SATURATION: 98 % | BODY MASS INDEX: 34.29 KG/M2 | HEART RATE: 62 BPM | WEIGHT: 231.5 LBS | DIASTOLIC BLOOD PRESSURE: 72 MMHG | HEIGHT: 69 IN

## 2017-08-31 DIAGNOSIS — I10 ESSENTIAL HYPERTENSION: ICD-10-CM

## 2017-08-31 DIAGNOSIS — E78.00 HYPERCHOLESTEREMIA: ICD-10-CM

## 2017-08-31 DIAGNOSIS — E66.9 OBESITY (BMI 30.0-34.9): ICD-10-CM

## 2017-08-31 DIAGNOSIS — K76.0 FATTY LIVER DISEASE, NONALCOHOLIC: ICD-10-CM

## 2017-08-31 DIAGNOSIS — N40.0 BENIGN PROSTATIC HYPERPLASIA WITHOUT LOWER URINARY TRACT SYMPTOMS: ICD-10-CM

## 2017-08-31 DIAGNOSIS — E03.4 HYPOTHYROIDISM DUE TO ACQUIRED ATROPHY OF THYROID: ICD-10-CM

## 2017-08-31 DIAGNOSIS — Z00.00 ENCOUNTER FOR PREVENTIVE HEALTH EXAMINATION: Primary | ICD-10-CM

## 2017-08-31 DIAGNOSIS — K21.9 GASTROESOPHAGEAL REFLUX DISEASE WITHOUT ESOPHAGITIS: ICD-10-CM

## 2017-08-31 DIAGNOSIS — N18.30 CHRONIC KIDNEY DISEASE, STAGE III (MODERATE): ICD-10-CM

## 2017-08-31 PROCEDURE — 99999 PR PBB SHADOW E&M-EST. PATIENT-LVL IV: CPT | Mod: PBBFAC,,, | Performed by: NURSE PRACTITIONER

## 2017-08-31 PROCEDURE — 99499 UNLISTED E&M SERVICE: CPT | Mod: S$GLB,,, | Performed by: NURSE PRACTITIONER

## 2017-08-31 PROCEDURE — G0439 PPPS, SUBSEQ VISIT: HCPCS | Mod: S$GLB,,, | Performed by: NURSE PRACTITIONER

## 2017-08-31 NOTE — PATIENT INSTRUCTIONS
Counseling and Referral of Other Preventative  (Italic type indicates deductible and co-insurance are waived)    Patient Name: Jose Manuel Ford  Today's Date: 8/31/2017      SERVICE LIMITATIONS RECOMMENDATION    Vaccines    · Pneumococcal (once after 65)    · Influenza (annually)    · Hepatitis B (if medium/high risk)    · Prevnar 13      Hepatitis B medium/high risk factors:       - End-stage renal disease       - Hemophiliacs who received Factor VII or         IX concentrates       - Clients of institutions for the mentally             retarded       - Persons who live in the same house as          a HepB carrier       - Homosexual men       - Illicit injectable drug abusers     Pneumococcal: Recommended to patient, declined     Influenza: Recommended to patient, declined     Hepatitis B: N/A     Prevnar 13: Recommended to patient, declined    Prostate cancer screening (annually to age 75)     Prostate specific antigen (PSA) Shared decision making with Provider. Sometimes a co-pay may be required if the patient decides to have this test. The USPSTF no longer recommends prostate cancer screening routinely in medicine: every 1 year     Colorectal cancer screening (to age 75)    · Fecal occult blood test (annual)  · Flexible sigmoidoscopy (5y)  · Screening colonoscopy (10y)  · Barium enema   N/A    Diabetes self-management training (no USPSTF recommendations)  Requires referral by treating physician for patient with diabetes or renal disease. 10 hours of initial DSMT sessions of no less than 30 minutes each in a continuous 12-month period. 2 hours of follow-up DSMT in subsequent years.  N/A    Glaucoma screening (no USPSTF recommendation)  Diabetes mellitus, family history   , age 50 or over    American, age 65 or over  Scheduled, see appointments    Medical nutrition therapy for diabetes or renal disease (no recommended schedule)  Requires referral by treating physician for patient with  diabetes or renal disease or kidney transplant within the past 3 years.  Can be provided in same year as diabetes self-management training (DSMT), and CMS recommends medical nutrition therapy take place after DSMT. Up to 3 hours for initial year and 2 hours in subsequent years.  N/A    Cardiovascular screening blood tests (every 5 years)  · Fasting lipid panel  Order as a panel if possible  Done this year, repeat every year    Diabetes screening tests (at least every 3 years, Medicare covers annually or at 6-month intervals for prediabetic patients)  · Fasting blood sugar (FBS) or glucose tolerance test (GTT)  Patient must be diagnosed with one of the following:       - Hypertension       - Dyslipidemia       - Obesity (BMI 30kg/m2)       - Previous elevated impaired FBS or GTT       ... or any two of the following:       - Overweight (BMI 25 but <30)       - Family history of diabetes       - Age 65 or older       - History of gestational diabetes or birth of baby weighing more than 9 pounds  Done this year, repeat every year    Abdominal aortic aneurysm screening (once)  · Sonogram   Limited to patients who meet one of the following criteria:       - Men who are 65-75 years old and have smoked more than 100 cigarette in their lifetime       - Anyone with a family history of abdominal aortic aneurysm       - Anyone recommended for screening by the USPSTF  Done, no repeat necessary    HIV screening (annually for increased risk patients)  · HIV-1 and HIV-2 by EIA, or SAVANA, rapid antibody test or oral mucosa transudate  Patients must be at increased risk for HIV infection per USPSTF guidelines or pregnant. Tests covered annually for patient at increased risk or as requested by the patient. Pregnant patients may receive up to 3 tests during pregnancy.  Risks discussed, screening is not recommended    Smoking cessation counseling (up to 8 sessions per year)  Patients must be asymptomatic of tobacco-related conditions  to receive as a preventative service.  Non-smoker    Subsequent annual wellness visit  At least 12 months since last AWV  Return in one year     The following information is provided to all patients.  This information is to help you find resources for any of the problems found today that may be affecting your health:                Living healthy guide: www.[facility].[place].[facility]      Understanding Diabetes: www.diabetes.org      Eating healthy: www.cdc.gov/healthyweight      CDC home safety checklist: www.cdc.gov/steadi/patient.html      Agency on Aging: www.goea.[place].[facility]      Alcoholics anonymous (AA): www.aa.org      Physical Activity: www.kevin.nih.gov/lb1krms      Tobacco use: www.quitwithusla.org

## 2017-08-31 NOTE — PROGRESS NOTES
"Jose Manuel Ford presented for a  Medicare AWV and comprehensive Health Risk Assessment today. The following components were reviewed and updated:    · Medical history  · Family History  · Social history  · Allergies and Current Medications  · Health Risk Assessment  · Health Maintenance  · Care Team     ** See Completed Assessments for Annual Wellness Visit within the encounter summary.**       The following assessments were completed:  · Living Situation  · CAGE  · Depression Screening  · Timed Get Up and Go  · Whisper Test  · Cognitive Function Screening  · Nutrition Screening  · ADL Screening  · PAQ Screening    Vitals:    08/31/17 0914   BP: 130/72   BP Location: Left arm   Patient Position: Sitting   Pulse: 62   SpO2: 98%   Weight: 105 kg (231 lb 7.7 oz)   Height: 5' 9" (1.753 m)     Body mass index is 34.18 kg/m².  Physical Exam      Diagnoses and health risks identified today and associated recommendations/orders:    1. Encounter for preventive health examination  ***    2. Essential hypertension  ***    3. Hypercholesteremia  ***    4. Hypothyroidism due to acquired atrophy of thyroid  ***    5. Gastroesophageal reflux disease without esophagitis  ***    6. Benign prostatic hyperplasia without lower urinary tract symptoms  ***    7. Chronic kidney disease, stage III (moderate)  ***    8. Fatty liver disease, nonalcoholic  ***    9. Obesity (BMI 30.0-34.9)  ***      Provided Jose Manuel with a 5-10 year written screening schedule and personal prevention plan. Recommendations were developed using the USPSTF age appropriate recommendations. Education, counseling, and referrals were provided as needed. After Visit Summary printed and given to patient which includes a list of additional screenings\tests needed.    Return in about 1 year (around 8/31/2018).    Katherin Paul NP  "

## 2017-08-31 NOTE — PROGRESS NOTES
"Jose Manuel Ford presented for a  Medicare AWV and comprehensive Health Risk Assessment today. The following components were reviewed and updated:    · Medical history  · Family History  · Social history  · Allergies and Current Medications  · Health Risk Assessment  · Health Maintenance  · Care Team     ** See Completed Assessments for Annual Wellness Visit within the encounter summary.**       The following assessments were completed:  · Living Situation  · CAGE  · Depression Screening  · Timed Get Up and Go  · Whisper Test  · Cognitive Function Screening  · Nutrition Screening  · ADL Screening  · PAQ Screening    Vitals:    08/31/17 0914   BP: 130/72   BP Location: Left arm   Patient Position: Sitting   Pulse: 62   SpO2: 98%   Weight: 105 kg (231 lb 7.7 oz)   Height: 5' 9" (1.753 m)     Body mass index is 34.18 kg/m².  Physical Exam      Diagnoses and health risks identified today and associated recommendations/orders:    1. Encounter for preventive health examination  ***    2. Essential hypertension  ***    3. Hypercholesteremia  ***    4. Hypothyroidism due to acquired atrophy of thyroid  ***    5. Gastroesophageal reflux disease without esophagitis  ***    6. Benign prostatic hyperplasia without lower urinary tract symptoms  ***    7. Chronic kidney disease, stage III (moderate)  ***    8. Fatty liver disease, nonalcoholic  ***    9. Obesity (BMI 30.0-34.9)  ***      Provided Jose Manuel with a 5-10 year written screening schedule and personal prevention plan. Recommendations were developed using the USPSTF age appropriate recommendations. Education, counseling, and referrals were provided as needed. After Visit Summary printed and given to patient which includes a list of additional screenings\tests needed.    No Follow-up on file.    Katherin Paul NP  "

## 2017-08-31 NOTE — Clinical Note
Your patient was seen today for a HRA visit. I have included a copy of my visit note, please review the note and feel free to contact me with any questions.  Thank you for allowing me to participate in the care of your patients.  Katherin Paul NP

## 2017-09-07 ENCOUNTER — LAB VISIT (OUTPATIENT)
Dept: LAB | Facility: HOSPITAL | Age: 77
End: 2017-09-07
Attending: UROLOGY
Payer: MEDICARE

## 2017-09-07 DIAGNOSIS — R97.20 ELEVATED PSA: ICD-10-CM

## 2017-09-07 DIAGNOSIS — N52.9 ERECTILE DYSFUNCTION, UNSPECIFIED ERECTILE DYSFUNCTION TYPE: ICD-10-CM

## 2017-09-07 PROCEDURE — 36415 COLL VENOUS BLD VENIPUNCTURE: CPT

## 2017-09-07 PROCEDURE — 84153 ASSAY OF PSA TOTAL: CPT

## 2017-09-08 LAB — COMPLEXED PSA SERPL-MCNC: 7.5 NG/ML

## 2017-09-11 ENCOUNTER — OFFICE VISIT (OUTPATIENT)
Dept: UROLOGY | Facility: CLINIC | Age: 77
End: 2017-09-11
Payer: MEDICARE

## 2017-09-11 VITALS
BODY MASS INDEX: 34.74 KG/M2 | DIASTOLIC BLOOD PRESSURE: 91 MMHG | SYSTOLIC BLOOD PRESSURE: 165 MMHG | WEIGHT: 234.56 LBS | HEIGHT: 69 IN | HEART RATE: 75 BPM

## 2017-09-11 DIAGNOSIS — Z12.5 PROSTATE CANCER SCREENING: Primary | ICD-10-CM

## 2017-09-11 DIAGNOSIS — N52.9 ERECTILE DYSFUNCTION, UNSPECIFIED ERECTILE DYSFUNCTION TYPE: ICD-10-CM

## 2017-09-11 LAB
BILIRUB SERPL-MCNC: NORMAL MG/DL
BLOOD URINE, POC: NORMAL
COLOR, POC UA: YELLOW
GLUCOSE UR QL STRIP: NORMAL
KETONES UR QL STRIP: NORMAL
LEUKOCYTE ESTERASE URINE, POC: NORMAL
NITRITE, POC UA: NORMAL
PH, POC UA: 5
PROTEIN, POC: NORMAL
SPECIFIC GRAVITY, POC UA: 1.02
UROBILINOGEN, POC UA: NORMAL

## 2017-09-11 PROCEDURE — 99999 PR PBB SHADOW E&M-EST. PATIENT-LVL II: CPT | Mod: PBBFAC,,, | Performed by: UROLOGY

## 2017-09-11 PROCEDURE — 99214 OFFICE O/P EST MOD 30 MIN: CPT | Mod: 25,S$GLB,, | Performed by: UROLOGY

## 2017-09-11 PROCEDURE — 3008F BODY MASS INDEX DOCD: CPT | Mod: S$GLB,,, | Performed by: UROLOGY

## 2017-09-11 PROCEDURE — 81002 URINALYSIS NONAUTO W/O SCOPE: CPT | Mod: S$GLB,,, | Performed by: UROLOGY

## 2017-09-11 PROCEDURE — 1159F MED LIST DOCD IN RCRD: CPT | Mod: S$GLB,,, | Performed by: UROLOGY

## 2017-09-11 PROCEDURE — 3080F DIAST BP >= 90 MM HG: CPT | Mod: S$GLB,,, | Performed by: UROLOGY

## 2017-09-11 PROCEDURE — 3077F SYST BP >= 140 MM HG: CPT | Mod: S$GLB,,, | Performed by: UROLOGY

## 2017-09-11 NOTE — PROGRESS NOTES
Chief Complaint: Elevated PSA    HPI:   9/11/17: No complaints, voiding well.  PSA up a bit but still in his normal band.  Cialis too [place] here [place].  9/9/16: Doing well no complaints.  Uses half a cialis sporadically.  Is on a weight loss diet he likes and his PSA is up - he says he was on a diet when his PSA was up last time.  9/3/15: Here to discuss low T.  T was low end of normal when checked. Tried the cialis and it worked fine but he got some muscle cramping a few days later and it has gotten better since.  Hasn't setup a sleep study yet. Discussed T findings in detail and discussed risks/benefits of T therapy.  8/24/15: Returns for followup with PSA of 4.9 in 2/15; was 9.2 around the time of biopsy 2/13. Complains of a bump on the superior portion of his penis.  At night he has nocturia x3-4; snores but not bad he thinks.  Coffee in AM, tea sometimes during the day, diet coke in late afternoon.  No leakage or sudden urgency.  Usually has a good stream.  Has some ED and is concerned about low T.  Has a few beers at night; we discussed links for food and ETOH and impact on ED.  2/13: Pt had a recent TRUS/Bx with negative results. He has done a lot of reading and has a lot of questions regarding the need for biopsy and his results. We discussed  these for some time, including biopsy indications, PSA levels and their interpretation, potential false negatives, and management going forward. He felt crappy for 3 days.  He got better after that. He has had some resolving hematospermia and one episode of hematuria.    Allergies:  Review of patient's allergies indicates no known allergies.    Medications:  has a current medication list which includes the following prescription(s): amlodipine, atorvastatin, omeprazole, and thyroid (pork).    Review of Systems:  General: No fever, chills, fatigability, or weight loss.  Skin: No rashes, itching, or changes in color or texture of  skin.  Chest: Denies VELAZQUEZ, cyanosis, wheezing, cough, and sputum production.  Abdomen: Appetite fine. No weight loss. Denies diarrhea, abdominal pain, hematemesis, or blood in stool.  Musculoskeletal: No joint stiffness or swelling. Denies back pain.  : As above.  All other review of systems negative.    PMH:   has a past medical history of Depression; Elevated alkaline phosphatase level (5/27/2016); HTN (hypertension); Hypercholesteremia; Hypothyroidism; Obesity; and Renal manifestation of secondary diabetes mellitus.    PSH:   has no past surgical history on file.    FamHx: family history includes COPD in his father; Stroke in his mother.    SocHx:  reports that he has quit smoking. He quit smokeless tobacco use about 49 years ago. He reports that he does not drink alcohol or use drugs.      Physical Exam:  Vitals:    09/11/17 0911   BP: (!) 165/91   Pulse: 75     General: A&Ox3, no apparent distress, no deformities  Neck: No masses, normal thyroid  Lungs: normal inspiration, no use of accessory muscles  Heart: normal pulse, no arrhythmias  Abdomen: Soft, NT, ND  Skin: The skin is warm and dry. No jaundice.  Ext: No c/c/e.  :   9/11/17: Test desc martha, no abnormalities of epididymus. Penis normal, with normal penile and scrotal skin. Meatus normal. Normal rectal tone, no hemorrhoids. Prost 40 gm no nodules or masses appreciated. SV not palpable. Perineum and anus normal.  Lipoma about 2 cm left SP area.    Labs/Studies: Urinalysis performed in clinic, summary: UA normal  PSA    11-13: 3-9    9/16: 6.3    9/17: 7.5    Impression/Plan:   1. Viagra for ED, 100mg dosing  2. RTC 1 year, PSA 1 year.

## 2017-09-22 ENCOUNTER — OFFICE VISIT (OUTPATIENT)
Dept: OPHTHALMOLOGY | Facility: CLINIC | Age: 77
End: 2017-09-22
Payer: MEDICARE

## 2017-09-22 DIAGNOSIS — H25.013 CORTICAL AGE-RELATED CATARACT OF BOTH EYES: ICD-10-CM

## 2017-09-22 DIAGNOSIS — H52.4 PRESBYOPIA: ICD-10-CM

## 2017-09-22 DIAGNOSIS — H52.223 REGULAR ASTIGMATISM OF BOTH EYES: ICD-10-CM

## 2017-09-22 DIAGNOSIS — H25.13 NUCLEAR SCLEROSIS, BILATERAL: Primary | ICD-10-CM

## 2017-09-22 PROCEDURE — 92004 COMPRE OPH EXAM NEW PT 1/>: CPT | Mod: S$GLB,,, | Performed by: OPTOMETRIST

## 2017-09-22 PROCEDURE — 92015 DETERMINE REFRACTIVE STATE: CPT | Mod: S$GLB,,, | Performed by: OPTOMETRIST

## 2017-09-22 PROCEDURE — 99999 PR PBB SHADOW E&M-EST. PATIENT-LVL I: CPT | Mod: PBBFAC,,, | Performed by: OPTOMETRIST

## 2017-09-22 NOTE — PROGRESS NOTES
HPI     Pt is a new pt. Pt has never had an eye exam before. Non DM. Pt states no   complaints. Pt wears readers. No particular problems with far vision. No   pain or discomfort. No gtts. No family hx of eye disease.     Last edited by Solis Alves, Patient Care Assistant on 9/22/2017  9:24   AM. (History)            Assessment /Plan     For exam results, see Encounter Report.    Nuclear sclerosis, bilateral  Cortical age-related cataract of both eyes  Surgery is not indicated at this time. Monitor 12 months.    Regular astigmatism of both eyes  Presbyopia  Eyeglass Final Rx     Eyeglass Final Rx       Sphere Cylinder Axis Add    Right [place] +1.25 165 +2.25    Left -0.50 +1.00 025 +2.25    Type:  PAL    Expiration Date:  9/23/2018          Eyeglass Final Rx #2       Sphere Cylinder Axis Add    Right +2.00 +1.25 165     Left +1.75 +1.00 025     Type:  SVL    Expiration Date:  9/23/2018    Comments:  Reading only              RTC 1 yr for dilated eye exam or PRN if any problems.   Discussed above and answered questions.

## 2017-11-15 ENCOUNTER — LAB VISIT (OUTPATIENT)
Dept: LAB | Facility: HOSPITAL | Age: 77
End: 2017-11-15
Attending: INTERNAL MEDICINE
Payer: MEDICARE

## 2017-11-15 ENCOUNTER — OFFICE VISIT (OUTPATIENT)
Dept: FAMILY MEDICINE | Facility: CLINIC | Age: 77
End: 2017-11-15
Payer: MEDICARE

## 2017-11-15 VITALS
SYSTOLIC BLOOD PRESSURE: 138 MMHG | TEMPERATURE: 96 F | OXYGEN SATURATION: 98 % | HEIGHT: 69 IN | RESPIRATION RATE: 16 BRPM | BODY MASS INDEX: 34.32 KG/M2 | DIASTOLIC BLOOD PRESSURE: 70 MMHG | HEART RATE: 72 BPM | WEIGHT: 231.69 LBS

## 2017-11-15 DIAGNOSIS — E78.00 HYPERCHOLESTEREMIA: ICD-10-CM

## 2017-11-15 DIAGNOSIS — E03.4 HYPOTHYROIDISM DUE TO ACQUIRED ATROPHY OF THYROID: ICD-10-CM

## 2017-11-15 DIAGNOSIS — I10 ESSENTIAL HYPERTENSION: Primary | ICD-10-CM

## 2017-11-15 DIAGNOSIS — K76.0 FATTY LIVER DISEASE, NONALCOHOLIC: ICD-10-CM

## 2017-11-15 DIAGNOSIS — N40.0 BENIGN PROSTATIC HYPERPLASIA WITHOUT LOWER URINARY TRACT SYMPTOMS: ICD-10-CM

## 2017-11-15 DIAGNOSIS — K21.9 GASTROESOPHAGEAL REFLUX DISEASE WITHOUT ESOPHAGITIS: ICD-10-CM

## 2017-11-15 DIAGNOSIS — R97.20 ELEVATED PSA: ICD-10-CM

## 2017-11-15 DIAGNOSIS — I10 ESSENTIAL HYPERTENSION: ICD-10-CM

## 2017-11-15 LAB
ALBUMIN SERPL BCP-MCNC: 3.8 G/DL
ALP SERPL-CCNC: 138 U/L
ALT SERPL W/O P-5'-P-CCNC: 24 U/L
ANION GAP SERPL CALC-SCNC: 10 MMOL/L
AST SERPL-CCNC: 22 U/L
BASOPHILS # BLD AUTO: 0 K/UL
BASOPHILS NFR BLD: 0 %
BILIRUB SERPL-MCNC: 0.6 MG/DL
BUN SERPL-MCNC: 15 MG/DL
CALCIUM SERPL-MCNC: 9.4 MG/DL
CHLORIDE SERPL-SCNC: 107 MMOL/L
CHOLEST SERPL-MCNC: 304 MG/DL
CHOLEST/HDLC SERPL: 6 {RATIO}
CO2 SERPL-SCNC: 25 MMOL/L
CREAT SERPL-MCNC: 1.2 MG/DL
DIFFERENTIAL METHOD: NORMAL
EOSINOPHIL # BLD AUTO: 0.2 K/UL
EOSINOPHIL NFR BLD: 3.8 %
ERYTHROCYTE [DISTWIDTH] IN BLOOD BY AUTOMATED COUNT: 13.6 %
EST. GFR  (AFRICAN AMERICAN): >60 ML/MIN/1.73 M^2
EST. GFR  (NON AFRICAN AMERICAN): 58.4 ML/MIN/1.73 M^2
GLUCOSE SERPL-MCNC: 103 MG/DL
HCT VFR BLD AUTO: 44 %
HDLC SERPL-MCNC: 51 MG/DL
HDLC SERPL: 16.8 %
HGB BLD-MCNC: 14.6 G/DL
IMM GRANULOCYTES # BLD AUTO: 0.01 K/UL
IMM GRANULOCYTES NFR BLD AUTO: 0.2 %
LDLC SERPL CALC-MCNC: 205.8 MG/DL
LYMPHOCYTES # BLD AUTO: 1.6 K/UL
LYMPHOCYTES NFR BLD: 28.4 %
MCH RBC QN AUTO: 30.7 PG
MCHC RBC AUTO-ENTMCNC: 33.2 G/DL
MCV RBC AUTO: 92 FL
MONOCYTES # BLD AUTO: 0.6 K/UL
MONOCYTES NFR BLD: 10.8 %
NEUTROPHILS # BLD AUTO: 3.2 K/UL
NEUTROPHILS NFR BLD: 56.8 %
NONHDLC SERPL-MCNC: 253 MG/DL
NRBC BLD-RTO: 0 /100 WBC
PLATELET # BLD AUTO: 199 K/UL
PMV BLD AUTO: 12.1 FL
POTASSIUM SERPL-SCNC: 4.9 MMOL/L
PROT SERPL-MCNC: 7.8 G/DL
RBC # BLD AUTO: 4.76 M/UL
SODIUM SERPL-SCNC: 142 MMOL/L
T4 FREE SERPL-MCNC: 0.84 NG/DL
TRIGL SERPL-MCNC: 236 MG/DL
TSH SERPL DL<=0.005 MIU/L-ACNC: 8.02 UIU/ML
WBC # BLD AUTO: 5.57 K/UL

## 2017-11-15 PROCEDURE — 80061 LIPID PANEL: CPT

## 2017-11-15 PROCEDURE — 84443 ASSAY THYROID STIM HORMONE: CPT

## 2017-11-15 PROCEDURE — 36415 COLL VENOUS BLD VENIPUNCTURE: CPT | Mod: PO

## 2017-11-15 PROCEDURE — 84439 ASSAY OF FREE THYROXINE: CPT

## 2017-11-15 PROCEDURE — 99499 UNLISTED E&M SERVICE: CPT | Mod: S$GLB,,, | Performed by: INTERNAL MEDICINE

## 2017-11-15 PROCEDURE — 99215 OFFICE O/P EST HI 40 MIN: CPT | Mod: S$GLB,,, | Performed by: INTERNAL MEDICINE

## 2017-11-15 PROCEDURE — 85025 COMPLETE CBC W/AUTO DIFF WBC: CPT

## 2017-11-15 PROCEDURE — 99999 PR PBB SHADOW E&M-EST. PATIENT-LVL IV: CPT | Mod: PBBFAC,,, | Performed by: INTERNAL MEDICINE

## 2017-11-15 PROCEDURE — 80053 COMPREHEN METABOLIC PANEL: CPT

## 2017-11-15 NOTE — PROGRESS NOTES
Subjective:       Patient ID: Jose Manuel Ford Jr. is a 77 y.o. male.    Chief Complaint: Follow-up; Hypertension; Hyperlipidemia; Hypothyroidism; Gastroesophageal Reflux; and Elevated PSA  -pt stopped his meds 2nd side effects----------feels much better now---------stays active----------watching diet and exercising----------no new symptoms today-----------bp has been good--------  Hypertension   The problem is controlled. Pertinent negatives include no chest pain, headaches, neck pain, palpitations or shortness of breath. Past treatments include lifestyle changes.   Hyperlipidemia   This is a chronic problem. Pertinent negatives include no chest pain, myalgias or shortness of breath.   Gastroesophageal Reflux   He reports no abdominal pain, no chest pain, no choking, no coughing, no nausea, no sore throat or no wheezing. Pertinent negatives include no fatigue.     Review of Systems   Constitutional: Negative for activity change, appetite change, chills, diaphoresis, fatigue, fever and unexpected weight change.   HENT: Negative for drooling, ear discharge, ear pain, facial swelling, hearing loss, mouth sores, nosebleeds, postnasal drip, rhinorrhea, sinus pressure, sneezing, sore throat and tinnitus.    Eyes: Negative for photophobia, redness and visual disturbance.   Respiratory: Negative for apnea, cough, choking, chest tightness, shortness of breath and wheezing.    Cardiovascular: Negative for chest pain, palpitations and leg swelling.   Gastrointestinal: Negative for abdominal distention, abdominal pain, anal bleeding, blood in stool, constipation, diarrhea, nausea and vomiting.   Endocrine: Negative for cold intolerance, heat intolerance, polydipsia, polyphagia and polyuria.   Genitourinary: Negative for difficulty urinating, dysuria, enuresis, flank pain, frequency, genital sores, hematuria and urgency.   Musculoskeletal: Negative for arthralgias, back pain, gait problem, joint swelling, myalgias, neck pain  and neck stiffness.   Skin: Negative for color change, pallor, rash and wound.   Allergic/Immunologic: Negative for food allergies and immunocompromised state.   Neurological: Negative for dizziness, tremors, seizures, syncope, facial asymmetry, speech difficulty, weakness, light-headedness, numbness and headaches.   Hematological: Negative for adenopathy. Does not bruise/bleed easily.   Psychiatric/Behavioral: Negative for agitation, behavioral problems, confusion, decreased concentration, dysphoric mood, hallucinations, self-injury, sleep disturbance and suicidal ideas. The patient is not nervous/anxious and is not hyperactive.        Objective:      Physical Exam   Constitutional: He is oriented to person, place, and time. He appears well-developed and well-nourished. No distress.   HENT:   Head: Normocephalic and atraumatic.   Eyes: Pupils are equal, round, and reactive to light.   Neck: Normal range of motion. Neck supple. No JVD present. Carotid bruit is not present. No tracheal deviation present. No thyromegaly present.   Cardiovascular: Normal rate, regular rhythm, normal heart sounds and intact distal pulses.    Pulmonary/Chest: Effort normal and breath sounds normal. No respiratory distress. He has no wheezes. He has no rales. He exhibits no tenderness.   Abdominal: Soft. Bowel sounds are normal. He exhibits no distension. There is no tenderness. There is no rebound and no guarding.   Musculoskeletal: Normal range of motion. He exhibits no edema or tenderness.   Lymphadenopathy:     He has no cervical adenopathy.   Neurological: He is alert and oriented to person, place, and time.   Skin: Skin is warm and dry. No rash noted. He is not diaphoretic. No erythema. No pallor.   Psychiatric: He has a normal mood and affect. His behavior is normal. Judgment and thought content normal.   Nursing note and vitals reviewed.      Assessment:       1. Essential hypertension    2. Hypercholesteremia    3. Hypothyroidism  due to acquired atrophy of thyroid    4. Gastroesophageal reflux disease without esophagitis    5. Fatty liver disease, nonalcoholic    6. Elevated PSA    7. Benign prostatic hyperplasia without lower urinary tract symptoms        Plan:        stable------------stopped norvasc,lipitor,synthroid,prilosec---feels better..    Watch diet, exercise.                Notes/labs reviewed.                 Check cbc,cmp,lipids,tsh.                F/u prn or 6 months.

## 2017-11-16 ENCOUNTER — TELEPHONE (OUTPATIENT)
Dept: FAMILY MEDICINE | Facility: CLINIC | Age: 77
End: 2017-11-16

## 2017-12-27 DIAGNOSIS — K21.9 GASTROESOPHAGEAL REFLUX DISEASE, ESOPHAGITIS PRESENCE NOT SPECIFIED: ICD-10-CM

## 2017-12-27 RX ORDER — OMEPRAZOLE 20 MG/1
CAPSULE, DELAYED RELEASE ORAL
Qty: 30 CAPSULE | Refills: 6 | Status: SHIPPED | OUTPATIENT
Start: 2017-12-27 | End: 2018-09-06 | Stop reason: SDUPTHER

## 2018-04-27 ENCOUNTER — PES CALL (OUTPATIENT)
Dept: ADMINISTRATIVE | Facility: CLINIC | Age: 78
End: 2018-04-27

## 2018-08-06 ENCOUNTER — PES CALL (OUTPATIENT)
Dept: ADMINISTRATIVE | Facility: CLINIC | Age: 78
End: 2018-08-06

## 2018-08-18 ENCOUNTER — PES CALL (OUTPATIENT)
Dept: ADMINISTRATIVE | Facility: CLINIC | Age: 78
End: 2018-08-18

## 2018-09-06 DIAGNOSIS — K21.9 GASTROESOPHAGEAL REFLUX DISEASE, ESOPHAGITIS PRESENCE NOT SPECIFIED: ICD-10-CM

## 2018-09-06 RX ORDER — OMEPRAZOLE 20 MG/1
CAPSULE, DELAYED RELEASE ORAL
Qty: 30 CAPSULE | Refills: 6 | Status: SHIPPED | OUTPATIENT
Start: 2018-09-06 | End: 2019-06-08 | Stop reason: SDUPTHER

## 2018-09-19 ENCOUNTER — LAB VISIT (OUTPATIENT)
Dept: LAB | Facility: HOSPITAL | Age: 78
End: 2018-09-19
Attending: UROLOGY
Payer: MEDICARE

## 2018-09-19 ENCOUNTER — OFFICE VISIT (OUTPATIENT)
Dept: UROLOGY | Facility: CLINIC | Age: 78
End: 2018-09-19
Payer: MEDICARE

## 2018-09-19 VITALS
HEIGHT: 69 IN | HEART RATE: 62 BPM | DIASTOLIC BLOOD PRESSURE: 84 MMHG | BODY MASS INDEX: 33.31 KG/M2 | WEIGHT: 224.88 LBS | SYSTOLIC BLOOD PRESSURE: 158 MMHG

## 2018-09-19 DIAGNOSIS — Z12.5 PROSTATE CANCER SCREENING: ICD-10-CM

## 2018-09-19 DIAGNOSIS — I10 HYPERTENSION, UNSPECIFIED TYPE: ICD-10-CM

## 2018-09-19 DIAGNOSIS — N52.9 ERECTILE DYSFUNCTION, UNSPECIFIED ERECTILE DYSFUNCTION TYPE: ICD-10-CM

## 2018-09-19 DIAGNOSIS — Z12.5 PROSTATE CANCER SCREENING: Primary | ICD-10-CM

## 2018-09-19 LAB
BILIRUB SERPL-MCNC: NORMAL MG/DL
BLOOD URINE, POC: NORMAL
COLOR, POC UA: NORMAL
COMPLEXED PSA SERPL-MCNC: 8.9 NG/ML
GLUCOSE UR QL STRIP: NORMAL
KETONES UR QL STRIP: NORMAL
LEUKOCYTE ESTERASE URINE, POC: NORMAL
NITRITE, POC UA: NORMAL
PH, POC UA: 5
PROTEIN, POC: NORMAL
SPECIFIC GRAVITY, POC UA: 1.01
UROBILINOGEN, POC UA: NORMAL

## 2018-09-19 PROCEDURE — 99999 PR PBB SHADOW E&M-EST. PATIENT-LVL II: CPT | Mod: PBBFAC,,, | Performed by: UROLOGY

## 2018-09-19 PROCEDURE — 99212 OFFICE O/P EST SF 10 MIN: CPT | Mod: PBBFAC | Performed by: UROLOGY

## 2018-09-19 PROCEDURE — 3079F DIAST BP 80-89 MM HG: CPT | Mod: CPTII,,, | Performed by: UROLOGY

## 2018-09-19 PROCEDURE — 81002 URINALYSIS NONAUTO W/O SCOPE: CPT | Mod: PBBFAC | Performed by: UROLOGY

## 2018-09-19 PROCEDURE — 99214 OFFICE O/P EST MOD 30 MIN: CPT | Mod: S$PBB,,, | Performed by: UROLOGY

## 2018-09-19 PROCEDURE — 3077F SYST BP >= 140 MM HG: CPT | Mod: CPTII,,, | Performed by: UROLOGY

## 2018-09-19 PROCEDURE — 84153 ASSAY OF PSA TOTAL: CPT

## 2018-09-19 PROCEDURE — 1101F PT FALLS ASSESS-DOCD LE1/YR: CPT | Mod: CPTII,,, | Performed by: UROLOGY

## 2018-09-19 PROCEDURE — 36415 COLL VENOUS BLD VENIPUNCTURE: CPT

## 2018-09-19 NOTE — PROGRESS NOTES
Chief Complaint: Elevated PSA    HPI:   9/19/18: PSA not done yet.  Viagra working.  9/11/17: No complaints, voiding well.  PSA up a bit but still in his normal band.  Cialis too expensive here [place]  9/9/16: Doing well no complaints.  Uses half a cialis sporadically.  Is on a weight loss diet he likes and his PSA is up - he says he was on a diet when his PSA was up last time.  9/3/15: Here to discuss low T.  T was low end of normal when checked. Tried the cialis and it worked fine but he got some muscle cramping a few days later and it has gotten better since.  Hasn't setup a sleep study yet. Discussed T findings in detail and discussed risks/benefits of T therapy.  8/24/15: Returns for followup with PSA of 4.9 in 2/15; was 9.2 around the time of biopsy 2/13. Complains of a bump on the superior portion of his penis.  At night he has nocturia x3-4; snores but not bad he thinks.  Coffee in AM, tea sometimes during the day, diet coke in late afternoon.  No leakage or sudden urgency.  Usually has a good stream.  Has some ED and is concerned about low T.  Has a few beers at night; we discussed links for food and ETOH and impact on ED.  2/13: Pt had a recent TRUS/Bx with negative results. He has done a lot of reading and has a lot of questions regarding the need for biopsy and his results. We discussed  these for some time, including biopsy indications, PSA levels and their interpretation, potential false negatives, and management going forward. He felt crappy for 3 days.  He got better after that. He has had some resolving hematospermia and one episode of hematuria.    Allergies:  Patient has no known allergies.    Medications:  has a current medication list which includes the following prescription(s): omeprazole and thyroid (pork).    Review of Systems:  General: No fever, chills, fatigability, or weight loss.  Skin: No rashes, itching, or changes in color or texture of skin.  Chest:  Denies VELAZQUEZ, cyanosis, wheezing, cough, and sputum production.  Abdomen: Appetite fine. No weight loss. Denies diarrhea, abdominal pain, hematemesis, or blood in stool.  Musculoskeletal: No joint stiffness or swelling. Denies back pain.  : As above.  All other review of systems negative.    PMH:   has a past medical history of Depression, Elevated alkaline phosphatase level (5/27/2016), HTN (hypertension), Hypercholesteremia, Hypothyroidism, Obesity, and Renal manifestation of secondary diabetes mellitus.    PSH:   has no past surgical history on file.    FamHx: family history includes COPD in his father; Stroke in his mother.    SocHx:  reports that he has quit smoking. He quit smokeless tobacco use about 50 years ago. He reports that he does not drink alcohol or use drugs.      Physical Exam:  Vitals:    09/19/18 0859   BP: (!) 158/84   Pulse: 62     General: A&Ox3, no apparent distress, no deformities  Neck: No masses, normal thyroid  Lungs: normal inspiration, no use of accessory muscles  Heart: normal pulse, no arrhythmias  Abdomen: Soft, NT, ND  Skin: The skin is warm and dry. No jaundice.  Ext: No c/c/e.  :   9/11/17: Test desc martha, no abnormalities of epididymus. Penis normal, with normal penile and scrotal skin. Meatus normal. Normal rectal tone, no hemorrhoids. Prost 40 gm no nodules or masses appreciated. SV not palpable. Perineum and anus normal.  Lipoma about 2 cm left SP area.    Labs/Studies: Urinalysis performed in clinic, summary: UA normal  PSA    11-13: 3-9    9/16: 6.3    9/17: 7.5    Impression/Plan:   1. Viagra for ED, 100mg dosing  2. RTC 1 year, PSA now and in 1 year.  3. HTN: discussed and referred to PCP for further management.

## 2018-09-20 ENCOUNTER — TELEPHONE (OUTPATIENT)
Dept: UROLOGY | Facility: CLINIC | Age: 78
End: 2018-09-20

## 2018-09-20 DIAGNOSIS — Z12.5 PROSTATE CANCER SCREENING: Primary | ICD-10-CM

## 2018-09-20 NOTE — TELEPHONE ENCOUNTER
He will likely recommend a repeat prostate biopsy. I will ask Dr. Nelson once he returns to the office.   Thanks!

## 2018-09-20 NOTE — TELEPHONE ENCOUNTER
----- Message from Fuentes Salinas MD sent at 9/20/2018  7:38 AM CDT -----  Do you know what he wanted to do with this patient?

## 2018-10-19 ENCOUNTER — OFFICE VISIT (OUTPATIENT)
Dept: FAMILY MEDICINE | Facility: CLINIC | Age: 78
End: 2018-10-19
Payer: MEDICARE

## 2018-10-19 VITALS
BODY MASS INDEX: 33.31 KG/M2 | OXYGEN SATURATION: 97 % | TEMPERATURE: 99 F | HEIGHT: 69 IN | HEART RATE: 63 BPM | SYSTOLIC BLOOD PRESSURE: 170 MMHG | WEIGHT: 224.88 LBS | DIASTOLIC BLOOD PRESSURE: 80 MMHG

## 2018-10-19 DIAGNOSIS — N40.0 BENIGN PROSTATIC HYPERPLASIA WITHOUT LOWER URINARY TRACT SYMPTOMS: ICD-10-CM

## 2018-10-19 DIAGNOSIS — R97.20 ELEVATED PSA: ICD-10-CM

## 2018-10-19 DIAGNOSIS — Z00.00 ENCOUNTER FOR PREVENTIVE HEALTH EXAMINATION: Primary | ICD-10-CM

## 2018-10-19 DIAGNOSIS — E66.9 OBESITY (BMI 30.0-34.9): ICD-10-CM

## 2018-10-19 DIAGNOSIS — I10 ESSENTIAL HYPERTENSION: ICD-10-CM

## 2018-10-19 DIAGNOSIS — K76.0 FATTY LIVER DISEASE, NONALCOHOLIC: ICD-10-CM

## 2018-10-19 DIAGNOSIS — N18.30 CHRONIC KIDNEY DISEASE, STAGE III (MODERATE): ICD-10-CM

## 2018-10-19 DIAGNOSIS — N52.9 ERECTILE DYSFUNCTION, UNSPECIFIED ERECTILE DYSFUNCTION TYPE: ICD-10-CM

## 2018-10-19 DIAGNOSIS — E78.00 HYPERCHOLESTEREMIA: ICD-10-CM

## 2018-10-19 DIAGNOSIS — K21.9 GASTROESOPHAGEAL REFLUX DISEASE WITHOUT ESOPHAGITIS: ICD-10-CM

## 2018-10-19 DIAGNOSIS — E03.4 HYPOTHYROIDISM DUE TO ACQUIRED ATROPHY OF THYROID: ICD-10-CM

## 2018-10-19 PROCEDURE — 3077F SYST BP >= 140 MM HG: CPT | Mod: CPTII,S$GLB,, | Performed by: NURSE PRACTITIONER

## 2018-10-19 PROCEDURE — 99213 OFFICE O/P EST LOW 20 MIN: CPT | Mod: PBBFAC,PO | Performed by: NURSE PRACTITIONER

## 2018-10-19 PROCEDURE — 3079F DIAST BP 80-89 MM HG: CPT | Mod: CPTII,S$GLB,, | Performed by: NURSE PRACTITIONER

## 2018-10-19 PROCEDURE — 99999 PR PBB SHADOW E&M-EST. PATIENT-LVL III: CPT | Mod: PBBFAC,,, | Performed by: NURSE PRACTITIONER

## 2018-10-19 PROCEDURE — 99499 UNLISTED E&M SERVICE: CPT | Mod: S$GLB,,, | Performed by: NURSE PRACTITIONER

## 2018-10-19 PROCEDURE — G0439 PPPS, SUBSEQ VISIT: HCPCS | Mod: S$GLB,,, | Performed by: NURSE PRACTITIONER

## 2018-10-19 NOTE — PATIENT INSTRUCTIONS
Counseling and Referral of Other Preventative  (Italic type indicates deductible and co-insurance are waived)    Patient Name: Jose Manuel Ford  Today's Date: 10/19/2018    Health Maintenance       Date Due Completion Date    Influenza Vaccine 12/07/2018 (Originally 8/1/2018) 8/31/2017 (Declined)    Override on 8/31/2017: Declined    Pneumococcal (65+) (1 of 2 - PCV13) 09/15/2021 (Originally 7/18/2006) ---    Lipid Panel 11/15/2018 11/15/2017    PROSTATE-SPECIFIC ANTIGEN 09/19/2019 9/19/2018    TETANUS VACCINE 05/27/2026 5/27/2016 (Declined)    Override on 5/27/2016: Declined        No orders of the defined types were placed in this encounter.    The following information is provided to all patients.  This information is to help you find resources for any of the problems found today that may be affecting your health:                Living healthy guide: www.[facility].[place].gov      Understanding Diabetes: www.diabetes.org      Eating healthy: www.cdc.gov/healthyweight      CDC home safety checklist: www.cdc.gov/steadi/patient.html      Agency on Aging: www.goea.[place].[facility]      Alcoholics anonymous (AA): www.aa.org      Physical Activity: www.kevin.nih.gov/nk0zwfa      Tobacco use: www.quitwithusla.org

## 2018-10-19 NOTE — PROGRESS NOTES
"Jose Manuel Ford presented for a  Medicare AWV and comprehensive Health Risk Assessment today. The following components were reviewed and updated:    · Medical history  · Family History  · Social history  · Allergies and Current Medications  · Health Risk Assessment  · Health Maintenance  · Care Team     ** See Completed Assessments for Annual Wellness Visit within the encounter summary.**       The following assessments were completed:  · Living Situation  · CAGE  · Depression Screening  · Timed Get Up and Go  · Whisper Test  · Cognitive Function Screening  · Nutrition Screening  · ADL Screening  · PAQ Screening    Vitals:    10/19/18 0858   BP: (!) 170/80   BP Location: Right arm   Patient Position: Sitting   Pulse: 63   Temp: 98.6 °F (37 °C)   TempSrc: Tympanic   SpO2: 97%   Weight: 102 kg (224 lb 13.9 oz)   Height: 5' 9" (1.753 m)     Body mass index is 33.21 kg/m².  Physical Exam   Constitutional: He appears well-developed. No distress.   HENT:   Head: Normocephalic and atraumatic.   Eyes: Pupils are equal, round, and reactive to light.   Neck: Carotid bruit is not present.   Cardiovascular: Normal rate, regular rhythm, normal heart sounds, intact distal pulses and normal pulses. Exam reveals no gallop.   No murmur heard.  Pulmonary/Chest: Effort normal and breath sounds normal.   Abdominal: Soft. Bowel sounds are normal. He exhibits no distension. There is no tenderness.   Musculoskeletal: Normal range of motion. He exhibits no edema.   Neurological: He exhibits normal muscle tone. Gait normal.   Skin: Skin is warm, dry and intact.   Psychiatric: He has a normal mood and affect. His speech is normal and behavior is normal. Judgment and thought content normal. Cognition and memory are normal.   Nursing note and vitals reviewed.        Diagnoses and health risks identified today and associated recommendations/orders:    1. Encounter for preventive health examination    2. Essential hypertension  This problem is " currently not controlled  BP elevated today and past visit - pt has stopped previous htn med's in the  Past and states blood pressure at home has been less than 140/90. Discuss risk of HTN- verabilized understanding  . Pt scheduled for BP check and annual exam 10/ 22/ 2018      3. Hypercholesteremia  Component      Latest Ref Rng & Units 11/15/2017   Cholesterol      120 - 199 mg/dL 304 (H)   Triglycerides      30 - 150 mg/dL 236 (H)   HDL      40 - 75 mg/dL 51   LDL Cholesterol      63.0 - 159.0 mg/dL 205.8 (H)   HDL/Chol Ratio      20.0 - 50.0 % 16.8 (L)   Total Cholesterol/HDL Ratio      2.0 - 5.0 6.0 (H)   Non-HDL Cholesterol      mg/dL 253   This problem is currently not controlled . Pt has stopped cholesterol med in  Past. Discuss the risk of hyperchol- verbalizied understanding. Pt scheduled for labs and appt with PCP 10/22/ 2018      4. Hypothyroidism due to acquired atrophy of thyroid  Component      Latest Ref Rng & Units 11/15/2017   TSH      0.400 - 4.000 uIU/mL 8.022 (H)   This problem is currently not controlled. Pt has stopped  Synthroid Please follow up with your PCP as planned to discuss.    5. Benign prostatic hyperplasia without lower urinary tract symptoms  Chronic and Stable -states urinary flow is stable and no recent infections. Continue current treatment plan as previously prescribed with your urologist      6. Elevated PSA  9/ 2018 8.9 Abnormally high   6.3 Abnormally high  CM 5.5 Abnormally high  CM   Chronic and Ongoing. . Under surveillance with urologist  Continue current treatment plan as previously prescribed with your  Urologist     7. Gastroesophageal reflux disease without esophagitis  Chronic and Stable on Priolesc. Continue current treatment plan as previously prescribed with your PCP    8. Chronic kidney disease, stage III (moderate)  58.4 Abnormal   58.   Chronic and Ongoing. BP elevated . Instructed to follow up for recheck on  10/22/ 2018      9. Erectile dysfunction,  unspecified erectile dysfunction type  Chronic and Stable on Vigara. Continue current treatment plan as previously prescribed with your PCP    10. Fatty liver disease, nonalcoholic  Liver is echogenic suggesting fatty infiltration noted per abdomen u/s 5/13/16  Chronic and Ongoing. Continue current treatment plan as previously prescribed with your PCP    11. Obesity (BMI 30.0-34.9)  Chronic and Ongoing.   BMI had decreased to  33. 21 kg - pt states he watching diet and more active around the house. Follow  Up with PCP    pt decline FLU shot     I offered to discuss end of life issues, including information on how to make advance directives that the patient could use to name someone who would make medical decisions on their behalf if they became too ill to make themselves.  x_Patient declined    Provided Jose Manuel with a 5-10 year written screening schedule and personal prevention plan. Recommendations were developed using the USPSTF age appropriate recommendations. Education, counseling, and referrals were provided as needed. After Visit Summary printed and given to patient which includes a list of additional screenings\tests needed.    Follow-up in about 1 year (around 10/19/2019).    Katherin Paul NP

## 2018-10-30 ENCOUNTER — LAB VISIT (OUTPATIENT)
Dept: LAB | Facility: HOSPITAL | Age: 78
End: 2018-10-30
Payer: MEDICARE

## 2018-10-30 ENCOUNTER — OFFICE VISIT (OUTPATIENT)
Dept: FAMILY MEDICINE | Facility: CLINIC | Age: 78
End: 2018-10-30
Payer: MEDICARE

## 2018-10-30 VITALS
WEIGHT: 224.88 LBS | BODY MASS INDEX: 33.31 KG/M2 | HEART RATE: 69 BPM | HEIGHT: 69 IN | TEMPERATURE: 98 F | OXYGEN SATURATION: 98 % | DIASTOLIC BLOOD PRESSURE: 78 MMHG | SYSTOLIC BLOOD PRESSURE: 146 MMHG

## 2018-10-30 DIAGNOSIS — K21.9 GASTROESOPHAGEAL REFLUX DISEASE WITHOUT ESOPHAGITIS: ICD-10-CM

## 2018-10-30 DIAGNOSIS — E03.4 HYPOTHYROIDISM DUE TO ACQUIRED ATROPHY OF THYROID: ICD-10-CM

## 2018-10-30 DIAGNOSIS — E78.00 HYPERCHOLESTEREMIA: ICD-10-CM

## 2018-10-30 DIAGNOSIS — R97.20 ELEVATED PSA: ICD-10-CM

## 2018-10-30 DIAGNOSIS — I10 ESSENTIAL HYPERTENSION: Primary | ICD-10-CM

## 2018-10-30 DIAGNOSIS — I10 ESSENTIAL HYPERTENSION: ICD-10-CM

## 2018-10-30 DIAGNOSIS — N40.0 BENIGN PROSTATIC HYPERPLASIA WITHOUT LOWER URINARY TRACT SYMPTOMS: ICD-10-CM

## 2018-10-30 LAB
ALBUMIN SERPL BCP-MCNC: 4.2 G/DL
ALP SERPL-CCNC: 126 U/L
ALT SERPL W/O P-5'-P-CCNC: 20 U/L
ANION GAP SERPL CALC-SCNC: 7 MMOL/L
AST SERPL-CCNC: 19 U/L
BASOPHILS # BLD AUTO: 0.01 K/UL
BASOPHILS NFR BLD: 0.2 %
BILIRUB SERPL-MCNC: 0.6 MG/DL
BUN SERPL-MCNC: 13 MG/DL
CALCIUM SERPL-MCNC: 9.9 MG/DL
CHLORIDE SERPL-SCNC: 107 MMOL/L
CHOLEST SERPL-MCNC: 255 MG/DL
CHOLEST/HDLC SERPL: 6.1 {RATIO}
CO2 SERPL-SCNC: 25 MMOL/L
CREAT SERPL-MCNC: 1.2 MG/DL
DIFFERENTIAL METHOD: ABNORMAL
EOSINOPHIL # BLD AUTO: 0.2 K/UL
EOSINOPHIL NFR BLD: 4.3 %
ERYTHROCYTE [DISTWIDTH] IN BLOOD BY AUTOMATED COUNT: 13.6 %
EST. GFR  (AFRICAN AMERICAN): >60 ML/MIN/1.73 M^2
EST. GFR  (NON AFRICAN AMERICAN): 58 ML/MIN/1.73 M^2
GLUCOSE SERPL-MCNC: 97 MG/DL
HCT VFR BLD AUTO: 45.3 %
HDLC SERPL-MCNC: 42 MG/DL
HDLC SERPL: 16.5 %
HGB BLD-MCNC: 15.1 G/DL
IMM GRANULOCYTES # BLD AUTO: 0 K/UL
IMM GRANULOCYTES NFR BLD AUTO: 0 %
LDLC SERPL CALC-MCNC: 179.8 MG/DL
LYMPHOCYTES # BLD AUTO: 1.6 K/UL
LYMPHOCYTES NFR BLD: 36.2 %
MCH RBC QN AUTO: 31.1 PG
MCHC RBC AUTO-ENTMCNC: 33.3 G/DL
MCV RBC AUTO: 93 FL
MONOCYTES # BLD AUTO: 0.5 K/UL
MONOCYTES NFR BLD: 11.7 %
NEUTROPHILS # BLD AUTO: 2.1 K/UL
NEUTROPHILS NFR BLD: 47.6 %
NONHDLC SERPL-MCNC: 213 MG/DL
NRBC BLD-RTO: 0 /100 WBC
PLATELET # BLD AUTO: 204 K/UL
PMV BLD AUTO: 12.4 FL
POTASSIUM SERPL-SCNC: 5.4 MMOL/L
PROT SERPL-MCNC: 7.8 G/DL
RBC # BLD AUTO: 4.86 M/UL
SODIUM SERPL-SCNC: 139 MMOL/L
T4 FREE SERPL-MCNC: 0.84 NG/DL
TRIGL SERPL-MCNC: 166 MG/DL
TSH SERPL DL<=0.005 MIU/L-ACNC: 6.45 UIU/ML
WBC # BLD AUTO: 4.37 K/UL

## 2018-10-30 PROCEDURE — 84439 ASSAY OF FREE THYROXINE: CPT

## 2018-10-30 PROCEDURE — 99213 OFFICE O/P EST LOW 20 MIN: CPT | Mod: PBBFAC,PO | Performed by: INTERNAL MEDICINE

## 2018-10-30 PROCEDURE — 99999 PR PBB SHADOW E&M-EST. PATIENT-LVL III: CPT | Mod: PBBFAC,,, | Performed by: INTERNAL MEDICINE

## 2018-10-30 PROCEDURE — 80053 COMPREHEN METABOLIC PANEL: CPT

## 2018-10-30 PROCEDURE — 3077F SYST BP >= 140 MM HG: CPT | Mod: CPTII,,, | Performed by: INTERNAL MEDICINE

## 2018-10-30 PROCEDURE — 3078F DIAST BP <80 MM HG: CPT | Mod: CPTII,,, | Performed by: INTERNAL MEDICINE

## 2018-10-30 PROCEDURE — 85025 COMPLETE CBC W/AUTO DIFF WBC: CPT

## 2018-10-30 PROCEDURE — 36415 COLL VENOUS BLD VENIPUNCTURE: CPT | Mod: PO

## 2018-10-30 PROCEDURE — 80061 LIPID PANEL: CPT

## 2018-10-30 PROCEDURE — 1101F PT FALLS ASSESS-DOCD LE1/YR: CPT | Mod: CPTII,,, | Performed by: INTERNAL MEDICINE

## 2018-10-30 PROCEDURE — 84443 ASSAY THYROID STIM HORMONE: CPT

## 2018-10-30 PROCEDURE — 99215 OFFICE O/P EST HI 40 MIN: CPT | Mod: S$PBB,,, | Performed by: INTERNAL MEDICINE

## 2018-10-30 NOTE — PROGRESS NOTES
Subjective:       Patient ID: Jose Manuel Ford Jr. is a 78 y.o. male.    Chief Complaint: Annual Exam; Hypertension; Hyperlipidemia; Gastroesophageal Reflux; Hypothyroidism; and Benign Prostatic Hypertrophy    Hypertension   Pertinent negatives include no chest pain, headaches, neck pain, palpitations or shortness of breath. Past treatments include lifestyle changes.   Hyperlipidemia   Pertinent negatives include no chest pain, myalgias or shortness of breath. Current antihyperlipidemic treatment includes diet change and exercise.   Gastroesophageal Reflux   He reports no abdominal pain, no chest pain, no choking, no coughing, no nausea, no sore throat or no wheezing. Pertinent negatives include no fatigue. He has tried a PPI for the symptoms. The treatment provided significant relief.   Benign Prostatic Hypertrophy   Irritative symptoms do not include frequency or urgency. Pertinent negatives include no chills, dysuria, hematuria, nausea or vomiting.     Past Medical History:   Diagnosis Date    Depression     Elevated alkaline phosphatase level 5/27/2016    HTN (hypertension)     Hypercholesteremia     Hypothyroidism     Obesity     Renal manifestation of secondary diabetes mellitus      No past surgical history on file.  Family History   Problem Relation Age of Onset    Stroke Mother     COPD Father     Cancer Neg Hx     Hearing loss Neg Hx     Kidney disease Neg Hx      Social History     Socioeconomic History    Marital status:      Spouse name: Not on file    Number of children: Not on file    Years of education: Not on file    Highest education level: Not on file   Social Needs    Financial resource strain: Not on file    Food insecurity - worry: Not on file    Food insecurity - inability: Not on file    Transportation needs - medical: Not on file    Transportation needs - non-medical: Not on file   Occupational History    Not on file   Tobacco Use    Smoking status: Former  Smoker    Smokeless tobacco: Former User     Quit date: 1/1/1968   Substance and Sexual Activity    Alcohol use: No     Alcohol/week: 0.0 oz    Drug use: No    Sexual activity: Yes   Other Topics Concern    Not on file   Social History Narrative    Not on file     Review of Systems   Constitutional: Negative for activity change, appetite change, chills, diaphoresis, fatigue, fever and unexpected weight change.   HENT: Negative for drooling, ear discharge, ear pain, facial swelling, hearing loss, mouth sores, nosebleeds, postnasal drip, rhinorrhea, sinus pressure, sneezing, sore throat, tinnitus, trouble swallowing and voice change.    Eyes: Negative for photophobia and redness.   Respiratory: Negative for apnea, cough, choking, chest tightness, shortness of breath and wheezing.    Cardiovascular: Negative for chest pain, palpitations and leg swelling.   Gastrointestinal: Negative for abdominal distention, abdominal pain, anal bleeding, blood in stool, constipation, diarrhea, nausea and vomiting.   Endocrine: Negative for cold intolerance, heat intolerance, polydipsia, polyphagia and polyuria.   Genitourinary: Negative for difficulty urinating, dysuria, enuresis, flank pain, frequency, genital sores, hematuria and urgency.   Musculoskeletal: Negative for arthralgias, back pain, gait problem, joint swelling, myalgias, neck pain and neck stiffness.   Skin: Negative for color change, pallor, rash and wound.   Allergic/Immunologic: Negative for food allergies and immunocompromised state.   Neurological: Negative for dizziness, tremors, seizures, syncope, facial asymmetry, speech difficulty, weakness, light-headedness, numbness and headaches.   Hematological: Negative for adenopathy. Does not bruise/bleed easily.   Psychiatric/Behavioral: Negative for agitation, behavioral problems, confusion, decreased concentration, dysphoric mood, hallucinations, self-injury, sleep disturbance and suicidal ideas. The patient is  not nervous/anxious and is not hyperactive.        Objective:      Physical Exam   Constitutional: He is oriented to person, place, and time. He appears well-developed and well-nourished. No distress.   HENT:   Head: Normocephalic and atraumatic.   Eyes: Pupils are equal, round, and reactive to light.   Neck: Normal range of motion. Neck supple. No JVD present. Carotid bruit is not present. No tracheal deviation present. No thyromegaly present.   Cardiovascular: Normal rate, regular rhythm, normal heart sounds and intact distal pulses.   Pulmonary/Chest: Effort normal and breath sounds normal. No respiratory distress. He has no wheezes. He has no rales. He exhibits no tenderness.   Abdominal: Soft. Bowel sounds are normal. He exhibits no distension. There is no tenderness. There is no rebound and no guarding.   Musculoskeletal: Normal range of motion. He exhibits no edema or tenderness.   Lymphadenopathy:     He has no cervical adenopathy.   Neurological: He is alert and oriented to person, place, and time.   Skin: Skin is warm and dry. No rash noted. He is not diaphoretic. No erythema. No pallor.   Psychiatric: He has a normal mood and affect. His behavior is normal. Judgment and thought content normal.   Nursing note and vitals reviewed.      CMP  Sodium   Date Value Ref Range Status   11/15/2017 142 136 - 145 mmol/L Final     Potassium   Date Value Ref Range Status   11/15/2017 4.9 3.5 - 5.1 mmol/L Final     Chloride   Date Value Ref Range Status   11/15/2017 107 95 - 110 mmol/L Final     CO2   Date Value Ref Range Status   11/15/2017 25 23 - 29 mmol/L Final     Glucose   Date Value Ref Range Status   11/15/2017 103 70 - 110 mg/dL Final     BUN, Bld   Date Value Ref Range Status   11/15/2017 15 8 - 23 mg/dL Final     Creatinine   Date Value Ref Range Status   11/15/2017 1.2 0.5 - 1.4 mg/dL Final     Calcium   Date Value Ref Range Status   11/15/2017 9.4 8.7 - 10.5 mg/dL Final     Total Protein   Date Value Ref  Range Status   11/15/2017 7.8 6.0 - 8.4 g/dL Final     Albumin   Date Value Ref Range Status   11/15/2017 3.8 3.5 - 5.2 g/dL Final   09/06/2016 4.2 3.6 - 5.1 g/dL Final     Comment:     @ Test Performed By:  Nekst Mckeon Mayela Becerril M.D., TONO.,   [address] [place] [id number]  [facility]  [id number]       Total Bilirubin   Date Value Ref Range Status   11/15/2017 0.6 0.1 - 1.0 mg/dL Final     Comment:     For infants and newborns, interpretation of results should be based  on gestational age, weight and in agreement with clinical  observations.  Premature Infant recommended reference ranges:  Up to 24 hours.............<8.0 mg/dL  Up to 48 hours............<12.0 mg/dL  3-5 days..................<15.0 mg/dL  6-29 days.................<15.0 mg/dL       Alkaline Phosphatase   Date Value Ref Range Status   11/15/2017 138 (H) 55 - 135 U/L Final     AST   Date Value Ref Range Status   11/15/2017 22 10 - 40 U/L Final     ALT   Date Value Ref Range Status   11/15/2017 24 10 - 44 U/L Final     Anion Gap   Date Value Ref Range Status   11/15/2017 10 8 - 16 mmol/L Final     eGFR if    Date Value Ref Range Status   11/15/2017 >60.0 >60 mL/min/1.73 m^2 Final     eGFR if non    Date Value Ref Range Status   11/15/2017 58.4 (A) >60 mL/min/1.73 m^2 Final     Comment:     Calculation used to obtain the estimated glomerular filtration  rate (eGFR) is the CKD-EPI equation.        Lab Results   Component Value Date    WBC 5.57 11/15/2017    HGB 14.6 11/15/2017    HCT 44.0 11/15/2017    MCV 92 11/15/2017     11/15/2017     Lab Results   Component Value Date    CHOL 304 (H) 11/15/2017     Lab Results   Component Value Date    HDL 51 11/15/2017     Lab Results   Component Value Date    LDLCALC 205.8 (H) 11/15/2017     Lab Results   Component Value Date    TRIG 236 (H) 11/15/2017     Lab Results   Component Value Date    CHOLHDL 16.8 (L) 11/15/2017      Lab Results   Component Value Date    TSH 8.022 (H) 11/15/2017     Lab Results   Component Value Date    HGBA1C 5.7 08/21/2014     Assessment:       1. Essential hypertension    2. Hypercholesteremia    3. Hypothyroidism due to acquired atrophy of thyroid    4. Gastroesophageal reflux disease without esophagitis    5. Elevated PSA    6. Benign prostatic hyperplasia without lower urinary tract symptoms        Plan:   Essential hypertension------declines med--------------wants to try lifestyle changes------follow bp.       -     Comprehensive metabolic panel; Future; Expected date: 10/30/2018  -     CBC auto differential; Future; Expected date: 10/30/2018    Hypercholesteremia----declines statin 2nd side effects..  -     Lipid panel; Future; Expected date: 10/30/2018    Hypothyroidism due to acquired atrophy of thyroid------declines med 2nd side effects..  -     TSH; Future; Expected date: 10/30/2018  -     T4, free; Future; Expected date: 10/30/2018    Gastroesophageal reflux disease without esophagitis-----------ppi prn.    Elevated PSA------sees urology.    Benign prostatic hyperplasia without lower urinary tract symptoms    F/u 6 months. declines colon, fitkit.

## 2018-10-31 ENCOUNTER — TELEPHONE (OUTPATIENT)
Dept: FAMILY MEDICINE | Facility: CLINIC | Age: 78
End: 2018-10-31

## 2019-05-08 ENCOUNTER — PES CALL (OUTPATIENT)
Dept: ADMINISTRATIVE | Facility: CLINIC | Age: 79
End: 2019-05-08

## 2019-06-08 DIAGNOSIS — K21.9 GASTROESOPHAGEAL REFLUX DISEASE, ESOPHAGITIS PRESENCE NOT SPECIFIED: ICD-10-CM

## 2019-06-09 RX ORDER — OMEPRAZOLE 20 MG/1
CAPSULE, DELAYED RELEASE ORAL
Qty: 30 CAPSULE | Refills: 6 | Status: SHIPPED | OUTPATIENT
Start: 2019-06-09 | End: 2019-12-29

## 2019-09-23 ENCOUNTER — PATIENT OUTREACH (OUTPATIENT)
Dept: ADMINISTRATIVE | Facility: HOSPITAL | Age: 79
End: 2019-09-23

## 2019-11-12 ENCOUNTER — PATIENT OUTREACH (OUTPATIENT)
Dept: ADMINISTRATIVE | Facility: HOSPITAL | Age: 79
End: 2019-11-12

## 2019-11-12 NOTE — PROGRESS NOTES
Pt Scheduled 11/20/2019       Sindhu AVILA LPN Care Coordinator  Care Coordination Department  [facility]  [phone]

## 2019-11-20 ENCOUNTER — OFFICE VISIT (OUTPATIENT)
Dept: FAMILY MEDICINE | Facility: CLINIC | Age: 79
End: 2019-11-20
Payer: MEDICARE

## 2019-11-20 ENCOUNTER — LAB VISIT (OUTPATIENT)
Dept: LAB | Facility: HOSPITAL | Age: 79
End: 2019-11-20
Attending: UROLOGY
Payer: MEDICARE

## 2019-11-20 VITALS
BODY MASS INDEX: 33.63 KG/M2 | OXYGEN SATURATION: 98 % | SYSTOLIC BLOOD PRESSURE: 155 MMHG | HEART RATE: 80 BPM | TEMPERATURE: 98 F | DIASTOLIC BLOOD PRESSURE: 72 MMHG | WEIGHT: 227.75 LBS

## 2019-11-20 DIAGNOSIS — E78.00 HYPERCHOLESTEREMIA: ICD-10-CM

## 2019-11-20 DIAGNOSIS — E03.4 HYPOTHYROIDISM DUE TO ACQUIRED ATROPHY OF THYROID: ICD-10-CM

## 2019-11-20 DIAGNOSIS — R97.20 ELEVATED PSA: ICD-10-CM

## 2019-11-20 DIAGNOSIS — I10 ESSENTIAL HYPERTENSION: Primary | ICD-10-CM

## 2019-11-20 DIAGNOSIS — K76.0 FATTY LIVER DISEASE, NONALCOHOLIC: ICD-10-CM

## 2019-11-20 DIAGNOSIS — I10 ESSENTIAL HYPERTENSION: ICD-10-CM

## 2019-11-20 DIAGNOSIS — K21.9 GASTROESOPHAGEAL REFLUX DISEASE WITHOUT ESOPHAGITIS: ICD-10-CM

## 2019-11-20 DIAGNOSIS — Z12.5 PROSTATE CANCER SCREENING: ICD-10-CM

## 2019-11-20 LAB
ALBUMIN SERPL BCP-MCNC: 4.2 G/DL (ref 3.5–5.2)
ALP SERPL-CCNC: 140 U/L (ref 55–135)
ALT SERPL W/O P-5'-P-CCNC: 22 U/L (ref 10–44)
ANION GAP SERPL CALC-SCNC: 8 MMOL/L (ref 8–16)
AST SERPL-CCNC: 20 U/L (ref 10–40)
BASOPHILS # BLD AUTO: 0.01 K/UL (ref 0–0.2)
BASOPHILS NFR BLD: 0.2 % (ref 0–1.9)
BILIRUB SERPL-MCNC: 0.7 MG/DL (ref 0.1–1)
BUN SERPL-MCNC: 15 MG/DL (ref 8–23)
CALCIUM SERPL-MCNC: 9.8 MG/DL (ref 8.7–10.5)
CHLORIDE SERPL-SCNC: 107 MMOL/L (ref 95–110)
CHOLEST SERPL-MCNC: 271 MG/DL (ref 120–199)
CHOLEST/HDLC SERPL: 5.4 {RATIO} (ref 2–5)
CO2 SERPL-SCNC: 25 MMOL/L (ref 23–29)
COMPLEXED PSA SERPL-MCNC: 11.8 NG/ML (ref 0–4)
CREAT SERPL-MCNC: 1.2 MG/DL (ref 0.5–1.4)
DIFFERENTIAL METHOD: ABNORMAL
EOSINOPHIL # BLD AUTO: 0.3 K/UL (ref 0–0.5)
EOSINOPHIL NFR BLD: 4.8 % (ref 0–8)
ERYTHROCYTE [DISTWIDTH] IN BLOOD BY AUTOMATED COUNT: 13.8 % (ref 11.5–14.5)
EST. GFR  (AFRICAN AMERICAN): >60 ML/MIN/1.73 M^2
EST. GFR  (NON AFRICAN AMERICAN): 57.6 ML/MIN/1.73 M^2
GLUCOSE SERPL-MCNC: 92 MG/DL (ref 70–110)
HCT VFR BLD AUTO: 47.4 % (ref 40–54)
HDLC SERPL-MCNC: 50 MG/DL (ref 40–75)
HDLC SERPL: 18.5 % (ref 20–50)
HGB BLD-MCNC: 15.2 G/DL (ref 14–18)
IMM GRANULOCYTES # BLD AUTO: 0.01 K/UL (ref 0–0.04)
IMM GRANULOCYTES NFR BLD AUTO: 0.2 % (ref 0–0.5)
LDLC SERPL CALC-MCNC: 181 MG/DL (ref 63–159)
LYMPHOCYTES # BLD AUTO: 1.8 K/UL (ref 1–4.8)
LYMPHOCYTES NFR BLD: 32.8 % (ref 18–48)
MCH RBC QN AUTO: 31.1 PG (ref 27–31)
MCHC RBC AUTO-ENTMCNC: 32.1 G/DL (ref 32–36)
MCV RBC AUTO: 97 FL (ref 82–98)
MONOCYTES # BLD AUTO: 0.7 K/UL (ref 0.3–1)
MONOCYTES NFR BLD: 12.1 % (ref 4–15)
NEUTROPHILS # BLD AUTO: 2.8 K/UL (ref 1.8–7.7)
NEUTROPHILS NFR BLD: 49.9 % (ref 38–73)
NONHDLC SERPL-MCNC: 221 MG/DL
NRBC BLD-RTO: 0 /100 WBC
PLATELET # BLD AUTO: 185 K/UL (ref 150–350)
PMV BLD AUTO: 12.6 FL (ref 9.2–12.9)
POTASSIUM SERPL-SCNC: 4.6 MMOL/L (ref 3.5–5.1)
PROT SERPL-MCNC: 7.6 G/DL (ref 6–8.4)
RBC # BLD AUTO: 4.88 M/UL (ref 4.6–6.2)
SODIUM SERPL-SCNC: 140 MMOL/L (ref 136–145)
T4 FREE SERPL-MCNC: 0.86 NG/DL (ref 0.71–1.51)
TRIGL SERPL-MCNC: 200 MG/DL (ref 30–150)
TSH SERPL DL<=0.005 MIU/L-ACNC: 7.94 UIU/ML (ref 0.4–4)
WBC # BLD AUTO: 5.61 K/UL (ref 3.9–12.7)

## 2019-11-20 PROCEDURE — 36415 COLL VENOUS BLD VENIPUNCTURE: CPT | Mod: HCNC,PO

## 2019-11-20 PROCEDURE — 1126F PR PAIN SEVERITY QUANTIFIED, NO PAIN PRESENT: ICD-10-PCS | Mod: HCNC,S$GLB,, | Performed by: INTERNAL MEDICINE

## 2019-11-20 PROCEDURE — 84153 ASSAY OF PSA TOTAL: CPT | Mod: HCNC

## 2019-11-20 PROCEDURE — 99999 PR PBB SHADOW E&M-EST. PATIENT-LVL III: CPT | Mod: PBBFAC,HCNC,, | Performed by: INTERNAL MEDICINE

## 2019-11-20 PROCEDURE — 1159F MED LIST DOCD IN RCRD: CPT | Mod: HCNC,S$GLB,, | Performed by: INTERNAL MEDICINE

## 2019-11-20 PROCEDURE — 80061 LIPID PANEL: CPT | Mod: HCNC

## 2019-11-20 PROCEDURE — 3077F SYST BP >= 140 MM HG: CPT | Mod: HCNC,CPTII,S$GLB, | Performed by: INTERNAL MEDICINE

## 2019-11-20 PROCEDURE — 99214 OFFICE O/P EST MOD 30 MIN: CPT | Mod: HCNC,S$GLB,, | Performed by: INTERNAL MEDICINE

## 2019-11-20 PROCEDURE — 84443 ASSAY THYROID STIM HORMONE: CPT | Mod: HCNC

## 2019-11-20 PROCEDURE — 1101F PT FALLS ASSESS-DOCD LE1/YR: CPT | Mod: HCNC,CPTII,S$GLB, | Performed by: INTERNAL MEDICINE

## 2019-11-20 PROCEDURE — 3078F DIAST BP <80 MM HG: CPT | Mod: HCNC,CPTII,S$GLB, | Performed by: INTERNAL MEDICINE

## 2019-11-20 PROCEDURE — 1159F PR MEDICATION LIST DOCUMENTED IN MEDICAL RECORD: ICD-10-PCS | Mod: HCNC,S$GLB,, | Performed by: INTERNAL MEDICINE

## 2019-11-20 PROCEDURE — 99214 PR OFFICE/OUTPT VISIT, EST, LEVL IV, 30-39 MIN: ICD-10-PCS | Mod: HCNC,S$GLB,, | Performed by: INTERNAL MEDICINE

## 2019-11-20 PROCEDURE — 1126F AMNT PAIN NOTED NONE PRSNT: CPT | Mod: HCNC,S$GLB,, | Performed by: INTERNAL MEDICINE

## 2019-11-20 PROCEDURE — 84439 ASSAY OF FREE THYROXINE: CPT | Mod: HCNC

## 2019-11-20 PROCEDURE — 80053 COMPREHEN METABOLIC PANEL: CPT | Mod: HCNC

## 2019-11-20 PROCEDURE — 85025 COMPLETE CBC W/AUTO DIFF WBC: CPT | Mod: HCNC

## 2019-11-20 PROCEDURE — 3077F PR MOST RECENT SYSTOLIC BLOOD PRESSURE >= 140 MM HG: ICD-10-PCS | Mod: HCNC,CPTII,S$GLB, | Performed by: INTERNAL MEDICINE

## 2019-11-20 PROCEDURE — 1101F PR PT FALLS ASSESS DOC 0-1 FALLS W/OUT INJ PAST YR: ICD-10-PCS | Mod: HCNC,CPTII,S$GLB, | Performed by: INTERNAL MEDICINE

## 2019-11-20 PROCEDURE — 99999 PR PBB SHADOW E&M-EST. PATIENT-LVL III: ICD-10-PCS | Mod: PBBFAC,HCNC,, | Performed by: INTERNAL MEDICINE

## 2019-11-20 PROCEDURE — 3078F PR MOST RECENT DIASTOLIC BLOOD PRESSURE < 80 MM HG: ICD-10-PCS | Mod: HCNC,CPTII,S$GLB, | Performed by: INTERNAL MEDICINE

## 2019-11-21 ENCOUNTER — TELEPHONE (OUTPATIENT)
Dept: FAMILY MEDICINE | Facility: CLINIC | Age: 79
End: 2019-11-21

## 2019-11-21 DIAGNOSIS — R97.20 PSA ELEVATION: Primary | ICD-10-CM

## 2019-11-21 NOTE — TELEPHONE ENCOUNTER
Pt informed of results.  Pt voiced understanding.  Pt states she does not want to be on cholesterol medication and will make uro appt during his wife upcoming visit.

## 2019-11-21 NOTE — TELEPHONE ENCOUNTER
psa level has increased----------recommend urology consult .         Chol very high-- recommend chol med---

## 2019-12-18 ENCOUNTER — TELEPHONE (OUTPATIENT)
Dept: UROLOGY | Facility: CLINIC | Age: 79
End: 2019-12-18

## 2019-12-18 DIAGNOSIS — Z12.5 PROSTATE CANCER SCREENING: Primary | ICD-10-CM

## 2019-12-29 DIAGNOSIS — K21.9 GASTROESOPHAGEAL REFLUX DISEASE, ESOPHAGITIS PRESENCE NOT SPECIFIED: ICD-10-CM

## 2019-12-29 RX ORDER — OMEPRAZOLE 20 MG/1
CAPSULE, DELAYED RELEASE ORAL
Qty: 30 CAPSULE | Refills: 6 | Status: SHIPPED | OUTPATIENT
Start: 2019-12-29 | End: 2020-07-19

## 2020-02-06 ENCOUNTER — LAB VISIT (OUTPATIENT)
Dept: LAB | Facility: HOSPITAL | Age: 80
End: 2020-02-06
Attending: UROLOGY
Payer: MEDICARE

## 2020-02-06 DIAGNOSIS — Z12.5 PROSTATE CANCER SCREENING: ICD-10-CM

## 2020-02-06 LAB — COMPLEXED PSA SERPL-MCNC: 14.8 NG/ML (ref 0–4)

## 2020-02-06 PROCEDURE — 36415 COLL VENOUS BLD VENIPUNCTURE: CPT | Mod: HCNC

## 2020-02-06 PROCEDURE — 84153 ASSAY OF PSA TOTAL: CPT | Mod: HCNC

## 2020-02-10 ENCOUNTER — OFFICE VISIT (OUTPATIENT)
Dept: UROLOGY | Facility: CLINIC | Age: 80
End: 2020-02-10
Payer: MEDICARE

## 2020-02-10 VITALS
DIASTOLIC BLOOD PRESSURE: 76 MMHG | BODY MASS INDEX: 34.12 KG/M2 | HEIGHT: 69 IN | SYSTOLIC BLOOD PRESSURE: 154 MMHG | WEIGHT: 230.38 LBS

## 2020-02-10 DIAGNOSIS — N40.0 BENIGN PROSTATIC HYPERPLASIA, UNSPECIFIED WHETHER LOWER URINARY TRACT SYMPTOMS PRESENT: ICD-10-CM

## 2020-02-10 DIAGNOSIS — R97.20 ELEVATED PSA: ICD-10-CM

## 2020-02-10 DIAGNOSIS — I10 HYPERTENSION, UNSPECIFIED TYPE: ICD-10-CM

## 2020-02-10 DIAGNOSIS — Z12.5 PROSTATE CANCER SCREENING: Primary | ICD-10-CM

## 2020-02-10 LAB
BILIRUB SERPL-MCNC: NORMAL MG/DL
BLOOD URINE, POC: NORMAL
COLOR, POC UA: YELLOW
GLUCOSE UR QL STRIP: NORMAL
KETONES UR QL STRIP: NORMAL
LEUKOCYTE ESTERASE URINE, POC: NORMAL
NITRITE, POC UA: NORMAL
PH, POC UA: 7
PROTEIN, POC: NORMAL
SPECIFIC GRAVITY, POC UA: 1
UROBILINOGEN, POC UA: NORMAL

## 2020-02-10 PROCEDURE — 99999 PR PBB SHADOW E&M-EST. PATIENT-LVL II: ICD-10-PCS | Mod: PBBFAC,HCNC,, | Performed by: UROLOGY

## 2020-02-10 PROCEDURE — 3078F DIAST BP <80 MM HG: CPT | Mod: HCNC,CPTII,S$GLB, | Performed by: UROLOGY

## 2020-02-10 PROCEDURE — 1101F PT FALLS ASSESS-DOCD LE1/YR: CPT | Mod: HCNC,CPTII,S$GLB, | Performed by: UROLOGY

## 2020-02-10 PROCEDURE — 81002 URINALYSIS NONAUTO W/O SCOPE: CPT | Mod: HCNC,S$GLB,, | Performed by: UROLOGY

## 2020-02-10 PROCEDURE — 99214 PR OFFICE/OUTPT VISIT, EST, LEVL IV, 30-39 MIN: ICD-10-PCS | Mod: HCNC,25,S$GLB, | Performed by: UROLOGY

## 2020-02-10 PROCEDURE — 1126F PR PAIN SEVERITY QUANTIFIED, NO PAIN PRESENT: ICD-10-PCS | Mod: HCNC,S$GLB,, | Performed by: UROLOGY

## 2020-02-10 PROCEDURE — 99999 PR PBB SHADOW E&M-EST. PATIENT-LVL II: CPT | Mod: PBBFAC,HCNC,, | Performed by: UROLOGY

## 2020-02-10 PROCEDURE — 1159F MED LIST DOCD IN RCRD: CPT | Mod: HCNC,S$GLB,, | Performed by: UROLOGY

## 2020-02-10 PROCEDURE — 3077F PR MOST RECENT SYSTOLIC BLOOD PRESSURE >= 140 MM HG: ICD-10-PCS | Mod: HCNC,CPTII,S$GLB, | Performed by: UROLOGY

## 2020-02-10 PROCEDURE — 1159F PR MEDICATION LIST DOCUMENTED IN MEDICAL RECORD: ICD-10-PCS | Mod: HCNC,S$GLB,, | Performed by: UROLOGY

## 2020-02-10 PROCEDURE — 81002 POCT URINE DIPSTICK WITHOUT MICROSCOPE: ICD-10-PCS | Mod: HCNC,S$GLB,, | Performed by: UROLOGY

## 2020-02-10 PROCEDURE — 1126F AMNT PAIN NOTED NONE PRSNT: CPT | Mod: HCNC,S$GLB,, | Performed by: UROLOGY

## 2020-02-10 PROCEDURE — 99214 OFFICE O/P EST MOD 30 MIN: CPT | Mod: HCNC,25,S$GLB, | Performed by: UROLOGY

## 2020-02-10 PROCEDURE — 1101F PR PT FALLS ASSESS DOC 0-1 FALLS W/OUT INJ PAST YR: ICD-10-PCS | Mod: HCNC,CPTII,S$GLB, | Performed by: UROLOGY

## 2020-02-10 PROCEDURE — 3077F SYST BP >= 140 MM HG: CPT | Mod: HCNC,CPTII,S$GLB, | Performed by: UROLOGY

## 2020-02-10 PROCEDURE — 3078F PR MOST RECENT DIASTOLIC BLOOD PRESSURE < 80 MM HG: ICD-10-PCS | Mod: HCNC,CPTII,S$GLB, | Performed by: UROLOGY

## 2020-02-10 NOTE — LETTER
February 10, 2020      Itz Welch MD  [address]  [facility] [address]           [facility]  [address] [place] [address]  Phone: [phone]  Fax: [phone]          Patient: Jose Manuel Ford Jr.   MR Number: [id number]   YOB: 1940   Date of Visit: 2/10/2020       Dear Dr. Itz Welch:    Thank you for referring Jose Manuel Ford to me for evaluation. Attached you will find relevant portions of my assessment and plan of care.    If you have questions, please do not hesitate to call me. I look forward to following Jose Manuel Ford along with you.    Sincerely,    Gilles Nelson IV, MD    Enclosure  CC:  No Recipients    If you would like to receive this communication electronically, please contact [email or web address] or [phone] to request more information on [employer] access.    For providers and/or their staff who would like to refer a patient to [facility], please contact us through our one-stop-shop provider referral line, [facility] [place], at [phone].    If you feel you have received this communication in error or would no longer like to receive these types of communications, please e-mail [email or web address]

## 2020-02-10 NOTE — PROGRESS NOTES
Chief Complaint: Elevated PSA    HPI:   2/10/20: PSA has risen substantially.  Reviewed history in detail. [facility] MRI/BIopsy and he is not wanting to do it feeling fine; discussed in detail  9/19/18: PSA not done yet.  Viagra working.  9/11/17: No complaints, voiding well.  PSA up a bit but still in his normal band.  Cialis too expensive here [place] 100 [place]  9/9/16: Doing well no complaints.  Uses half a cialis sporadically.  Is on a weight loss diet he likes and his PSA is up - he says he was on a diet when his PSA was up last time.  9/3/15: Here to discuss low T.  T was low end of normal when checked. Tried the cialis and it worked fine but he got some muscle cramping a few days later and it has gotten better since.  Hasn't setup a sleep study yet. Discussed T findings in detail and discussed risks/benefits of T therapy.  8/24/15: Returns for followup with PSA of 4.9 in 2/15; was 9.2 around the time of biopsy 2/13. Complains of a bump on the superior portion of his penis.  At night he has nocturia x3-4; snores but not bad he thinks.  Coffee in AM, tea sometimes during the day, diet coke in late afternoon.  No leakage or sudden urgency.  Usually has a good stream.  Has some ED and is concerned about low T.  Has a few beers at night; we discussed links for food and ETOH and impact on ED.  2/13: Pt had a recent TRUS/Bx with negative results. He has done a lot of reading and has a lot of questions regarding the need for biopsy and his results. We discussed  these for some time, including biopsy indications, PSA levels and their interpretation, potential false negatives, and management going forward. He felt crappy for 3 days.  He got better after that. He has had some resolving hematospermia and one episode of hematuria.    Allergies:  Patient has no known allergies.    Medications:  has a current medication list which includes the following prescription(s): omeprazole.    Review of  Systems:  General: No fever, chills, fatigability, or weight loss.  Skin: No rashes, itching, or changes in color or texture of skin.  Chest: Denies VELAZQUEZ, cyanosis, wheezing, cough, and sputum production.  Abdomen: Appetite fine. No weight loss. Denies diarrhea, abdominal pain, hematemesis, or blood in stool.  Musculoskeletal: No joint stiffness or swelling. Denies back pain.  : As above.  All other review of systems negative.    PMH:   has a past medical history of Depression, Elevated alkaline phosphatase level (5/27/2016), HTN (hypertension), Hypercholesteremia, Hypothyroidism, Obesity, and Renal manifestation of secondary diabetes mellitus.    PSH:   has no past surgical history on file.    FamHx: family history includes COPD in his father; Stroke in his mother.    SocHx:  reports that he has quit smoking. He quit smokeless tobacco use about 52 years ago. He reports that he does not drink alcohol or use drugs.      Physical Exam:  Vitals:    02/10/20 1531   BP: (!) 154/76     General: A&Ox3, no apparent distress, no deformities  Neck: No masses, normal thyroid  Lungs: normal inspiration, no use of accessory muscles  Heart: normal pulse, no arrhythmias  Abdomen: Soft, NT, ND  Skin: The skin is warm and dry. No jaundice.  Ext: No c/c/e.  :   9/11/17: Test desc martha, no abnormalities of epididymus. Penis normal, with normal penile and scrotal skin. Meatus normal. Normal rectal tone, no hemorrhoids. Prost 40 gm no nodules or masses appreciated. SV not palpable. Perineum and anus normal.  Lipoma about 2 cm left SP area.    Labs/Studies: Urinalysis performed in clinic, summary: UA normal  PSA    11-13: 3-9    9/16: 6.3    9/17: 7.5    11/19: 11.8    2/20: 14.8    Impression/Plan:   1. Viagra for ED but not needing it lately, 100mg dosing  2. Likely prostate cancer.  Pt defers.  PSA 6/12 with RTC 12 mo.  3. HTN: discussed; taking his meds; elevated..

## 2020-09-29 ENCOUNTER — PATIENT MESSAGE (OUTPATIENT)
Dept: OTHER | Facility: OTHER | Age: 80
End: 2020-09-29

## 2020-12-11 ENCOUNTER — PATIENT MESSAGE (OUTPATIENT)
Dept: OTHER | Facility: OTHER | Age: 80
End: 2020-12-11

## 2021-02-14 ENCOUNTER — TELEPHONE (OUTPATIENT)
Dept: UROLOGY | Facility: CLINIC | Age: 81
End: 2021-02-14

## 2021-02-22 ENCOUNTER — TELEPHONE (OUTPATIENT)
Dept: UROLOGY | Facility: CLINIC | Age: 81
End: 2021-02-22

## 2021-03-03 ENCOUNTER — PES CALL (OUTPATIENT)
Dept: ADMINISTRATIVE | Facility: CLINIC | Age: 81
End: 2021-03-03

## 2021-03-25 ENCOUNTER — PATIENT MESSAGE (OUTPATIENT)
Dept: ADMINISTRATIVE | Facility: HOSPITAL | Age: 81
End: 2021-03-25

## 2021-04-01 ENCOUNTER — PES CALL (OUTPATIENT)
Dept: ADMINISTRATIVE | Facility: CLINIC | Age: 81
End: 2021-04-01

## 2021-04-28 ENCOUNTER — PATIENT MESSAGE (OUTPATIENT)
Dept: RESEARCH | Facility: HOSPITAL | Age: 81
End: 2021-04-28

## 2021-06-22 ENCOUNTER — OFFICE VISIT (OUTPATIENT)
Dept: FAMILY MEDICINE | Facility: CLINIC | Age: 81
End: 2021-06-22
Payer: MEDICARE

## 2021-06-22 ENCOUNTER — LAB VISIT (OUTPATIENT)
Dept: LAB | Facility: HOSPITAL | Age: 81
End: 2021-06-22
Attending: INTERNAL MEDICINE
Payer: MEDICARE

## 2021-06-22 VITALS
HEART RATE: 71 BPM | WEIGHT: 233 LBS | TEMPERATURE: 97 F | RESPIRATION RATE: 16 BRPM | OXYGEN SATURATION: 95 % | SYSTOLIC BLOOD PRESSURE: 138 MMHG | DIASTOLIC BLOOD PRESSURE: 80 MMHG | BODY MASS INDEX: 34.41 KG/M2

## 2021-06-22 DIAGNOSIS — E78.00 HYPERCHOLESTEREMIA: ICD-10-CM

## 2021-06-22 DIAGNOSIS — Z12.5 ENCOUNTER FOR PROSTATE CANCER SCREENING: ICD-10-CM

## 2021-06-22 DIAGNOSIS — R97.20 ELEVATED PSA: ICD-10-CM

## 2021-06-22 DIAGNOSIS — E03.4 HYPOTHYROIDISM DUE TO ACQUIRED ATROPHY OF THYROID: ICD-10-CM

## 2021-06-22 DIAGNOSIS — I10 ESSENTIAL HYPERTENSION: ICD-10-CM

## 2021-06-22 DIAGNOSIS — E66.9 OBESITY (BMI 30.0-34.9): ICD-10-CM

## 2021-06-22 DIAGNOSIS — R97.20 ELEVATED PSA: Primary | ICD-10-CM

## 2021-06-22 LAB
ALBUMIN SERPL BCP-MCNC: 4.3 G/DL (ref 3.5–5.2)
ALP SERPL-CCNC: 127 U/L (ref 55–135)
ALT SERPL W/O P-5'-P-CCNC: 19 U/L (ref 10–44)
ANION GAP SERPL CALC-SCNC: 10 MMOL/L (ref 8–16)
AST SERPL-CCNC: 18 U/L (ref 10–40)
BASOPHILS # BLD AUTO: 0.01 K/UL (ref 0–0.2)
BASOPHILS NFR BLD: 0.2 % (ref 0–1.9)
BILIRUB SERPL-MCNC: 0.6 MG/DL (ref 0.1–1)
BUN SERPL-MCNC: 15 MG/DL (ref 8–23)
CALCIUM SERPL-MCNC: 9.7 MG/DL (ref 8.7–10.5)
CHLORIDE SERPL-SCNC: 108 MMOL/L (ref 95–110)
CHOLEST SERPL-MCNC: 269 MG/DL (ref 120–199)
CHOLEST/HDLC SERPL: 5.6 {RATIO} (ref 2–5)
CO2 SERPL-SCNC: 21 MMOL/L (ref 23–29)
CREAT SERPL-MCNC: 1.1 MG/DL (ref 0.5–1.4)
DIFFERENTIAL METHOD: ABNORMAL
EOSINOPHIL # BLD AUTO: 0.2 K/UL (ref 0–0.5)
EOSINOPHIL NFR BLD: 3.6 % (ref 0–8)
ERYTHROCYTE [DISTWIDTH] IN BLOOD BY AUTOMATED COUNT: 13.4 % (ref 11.5–14.5)
EST. GFR  (AFRICAN AMERICAN): >60 ML/MIN/1.73 M^2
EST. GFR  (NON AFRICAN AMERICAN): >60 ML/MIN/1.73 M^2
GLUCOSE SERPL-MCNC: 103 MG/DL (ref 70–110)
HCT VFR BLD AUTO: 42.9 % (ref 40–54)
HDLC SERPL-MCNC: 48 MG/DL (ref 40–75)
HDLC SERPL: 17.8 % (ref 20–50)
HGB BLD-MCNC: 14.6 G/DL (ref 14–18)
IMM GRANULOCYTES # BLD AUTO: 0 K/UL (ref 0–0.04)
IMM GRANULOCYTES NFR BLD AUTO: 0 % (ref 0–0.5)
LDLC SERPL CALC-MCNC: 186.4 MG/DL (ref 63–159)
LYMPHOCYTES # BLD AUTO: 1.8 K/UL (ref 1–4.8)
LYMPHOCYTES NFR BLD: 35.8 % (ref 18–48)
MCH RBC QN AUTO: 31.3 PG (ref 27–31)
MCHC RBC AUTO-ENTMCNC: 34 G/DL (ref 32–36)
MCV RBC AUTO: 92 FL (ref 82–98)
MONOCYTES # BLD AUTO: 0.6 K/UL (ref 0.3–1)
MONOCYTES NFR BLD: 11.7 % (ref 4–15)
NEUTROPHILS # BLD AUTO: 2.4 K/UL (ref 1.8–7.7)
NEUTROPHILS NFR BLD: 48.7 % (ref 38–73)
NONHDLC SERPL-MCNC: 221 MG/DL
NRBC BLD-RTO: 0 /100 WBC
PLATELET # BLD AUTO: 198 K/UL (ref 150–450)
PMV BLD AUTO: 12.1 FL (ref 9.2–12.9)
POTASSIUM SERPL-SCNC: 4 MMOL/L (ref 3.5–5.1)
PROT SERPL-MCNC: 7.7 G/DL (ref 6–8.4)
RBC # BLD AUTO: 4.66 M/UL (ref 4.6–6.2)
SODIUM SERPL-SCNC: 139 MMOL/L (ref 136–145)
T4 FREE SERPL-MCNC: 0.82 NG/DL (ref 0.71–1.51)
TRIGL SERPL-MCNC: 173 MG/DL (ref 30–150)
TSH SERPL DL<=0.005 MIU/L-ACNC: 6.64 UIU/ML (ref 0.4–4)
WBC # BLD AUTO: 4.94 K/UL (ref 3.9–12.7)

## 2021-06-22 PROCEDURE — 36415 COLL VENOUS BLD VENIPUNCTURE: CPT | Mod: PO | Performed by: INTERNAL MEDICINE

## 2021-06-22 PROCEDURE — 3288F FALL RISK ASSESSMENT DOCD: CPT | Mod: CPTII,S$GLB,, | Performed by: INTERNAL MEDICINE

## 2021-06-22 PROCEDURE — 1159F PR MEDICATION LIST DOCUMENTED IN MEDICAL RECORD: ICD-10-PCS | Mod: S$GLB,,, | Performed by: INTERNAL MEDICINE

## 2021-06-22 PROCEDURE — 99214 OFFICE O/P EST MOD 30 MIN: CPT | Mod: S$GLB,,, | Performed by: INTERNAL MEDICINE

## 2021-06-22 PROCEDURE — 1159F MED LIST DOCD IN RCRD: CPT | Mod: S$GLB,,, | Performed by: INTERNAL MEDICINE

## 2021-06-22 PROCEDURE — 1126F PR PAIN SEVERITY QUANTIFIED, NO PAIN PRESENT: ICD-10-PCS | Mod: S$GLB,,, | Performed by: INTERNAL MEDICINE

## 2021-06-22 PROCEDURE — 85025 COMPLETE CBC W/AUTO DIFF WBC: CPT | Performed by: INTERNAL MEDICINE

## 2021-06-22 PROCEDURE — 99499 UNLISTED E&M SERVICE: CPT | Mod: S$GLB,,, | Performed by: INTERNAL MEDICINE

## 2021-06-22 PROCEDURE — 84439 ASSAY OF FREE THYROXINE: CPT | Performed by: INTERNAL MEDICINE

## 2021-06-22 PROCEDURE — 80061 LIPID PANEL: CPT | Performed by: INTERNAL MEDICINE

## 2021-06-22 PROCEDURE — 1101F PT FALLS ASSESS-DOCD LE1/YR: CPT | Mod: CPTII,S$GLB,, | Performed by: INTERNAL MEDICINE

## 2021-06-22 PROCEDURE — 86803 HEPATITIS C AB TEST: CPT | Performed by: INTERNAL MEDICINE

## 2021-06-22 PROCEDURE — 3288F PR FALLS RISK ASSESSMENT DOCUMENTED: ICD-10-PCS | Mod: CPTII,S$GLB,, | Performed by: INTERNAL MEDICINE

## 2021-06-22 PROCEDURE — 99499 RISK ADDL DX/OHS AUDIT: ICD-10-PCS | Mod: S$GLB,,, | Performed by: INTERNAL MEDICINE

## 2021-06-22 PROCEDURE — 99214 PR OFFICE/OUTPT VISIT, EST, LEVL IV, 30-39 MIN: ICD-10-PCS | Mod: S$GLB,,, | Performed by: INTERNAL MEDICINE

## 2021-06-22 PROCEDURE — 1101F PR PT FALLS ASSESS DOC 0-1 FALLS W/OUT INJ PAST YR: ICD-10-PCS | Mod: CPTII,S$GLB,, | Performed by: INTERNAL MEDICINE

## 2021-06-22 PROCEDURE — 99999 PR PBB SHADOW E&M-EST. PATIENT-LVL III: ICD-10-PCS | Mod: PBBFAC,,, | Performed by: INTERNAL MEDICINE

## 2021-06-22 PROCEDURE — 80053 COMPREHEN METABOLIC PANEL: CPT | Performed by: INTERNAL MEDICINE

## 2021-06-22 PROCEDURE — 84153 ASSAY OF PSA TOTAL: CPT | Performed by: INTERNAL MEDICINE

## 2021-06-22 PROCEDURE — 1126F AMNT PAIN NOTED NONE PRSNT: CPT | Mod: S$GLB,,, | Performed by: INTERNAL MEDICINE

## 2021-06-22 PROCEDURE — 99999 PR PBB SHADOW E&M-EST. PATIENT-LVL III: CPT | Mod: PBBFAC,,, | Performed by: INTERNAL MEDICINE

## 2021-06-22 PROCEDURE — 84443 ASSAY THYROID STIM HORMONE: CPT | Performed by: INTERNAL MEDICINE

## 2021-06-23 ENCOUNTER — TELEPHONE (OUTPATIENT)
Dept: FAMILY MEDICINE | Facility: CLINIC | Age: 81
End: 2021-06-23

## 2021-06-23 DIAGNOSIS — R97.20 PSA ELEVATION: Primary | ICD-10-CM

## 2021-06-23 LAB
COMPLEXED PSA SERPL-MCNC: 18.3 NG/ML (ref 0–4)
HCV AB SERPL QL IA: NEGATIVE

## 2021-06-30 ENCOUNTER — PATIENT OUTREACH (OUTPATIENT)
Dept: ADMINISTRATIVE | Facility: OTHER | Age: 81
End: 2021-06-30

## 2021-07-21 ENCOUNTER — OFFICE VISIT (OUTPATIENT)
Dept: UROLOGY | Facility: CLINIC | Age: 81
End: 2021-07-21
Payer: MEDICARE

## 2021-07-21 VITALS
DIASTOLIC BLOOD PRESSURE: 67 MMHG | SYSTOLIC BLOOD PRESSURE: 150 MMHG | BODY MASS INDEX: 34.09 KG/M2 | WEIGHT: 230.81 LBS | HEART RATE: 77 BPM

## 2021-07-21 DIAGNOSIS — R97.20 ELEVATED PSA: Primary | ICD-10-CM

## 2021-07-21 PROCEDURE — 1101F PR PT FALLS ASSESS DOC 0-1 FALLS W/OUT INJ PAST YR: ICD-10-PCS | Mod: CPTII,S$GLB,, | Performed by: UROLOGY

## 2021-07-21 PROCEDURE — 1101F PT FALLS ASSESS-DOCD LE1/YR: CPT | Mod: CPTII,S$GLB,, | Performed by: UROLOGY

## 2021-07-21 PROCEDURE — 3078F PR MOST RECENT DIASTOLIC BLOOD PRESSURE < 80 MM HG: ICD-10-PCS | Mod: CPTII,S$GLB,, | Performed by: UROLOGY

## 2021-07-21 PROCEDURE — 3288F PR FALLS RISK ASSESSMENT DOCUMENTED: ICD-10-PCS | Mod: CPTII,S$GLB,, | Performed by: UROLOGY

## 2021-07-21 PROCEDURE — 3077F SYST BP >= 140 MM HG: CPT | Mod: CPTII,S$GLB,, | Performed by: UROLOGY

## 2021-07-21 PROCEDURE — 1126F PR PAIN SEVERITY QUANTIFIED, NO PAIN PRESENT: ICD-10-PCS | Mod: CPTII,S$GLB,, | Performed by: UROLOGY

## 2021-07-21 PROCEDURE — 99999 PR PBB SHADOW E&M-EST. PATIENT-LVL III: ICD-10-PCS | Mod: PBBFAC,,, | Performed by: UROLOGY

## 2021-07-21 PROCEDURE — 99999 PR PBB SHADOW E&M-EST. PATIENT-LVL III: CPT | Mod: PBBFAC,,, | Performed by: UROLOGY

## 2021-07-21 PROCEDURE — 1159F PR MEDICATION LIST DOCUMENTED IN MEDICAL RECORD: ICD-10-PCS | Mod: CPTII,S$GLB,, | Performed by: UROLOGY

## 2021-07-21 PROCEDURE — 99213 OFFICE O/P EST LOW 20 MIN: CPT | Mod: S$GLB,,, | Performed by: UROLOGY

## 2021-07-21 PROCEDURE — 1126F AMNT PAIN NOTED NONE PRSNT: CPT | Mod: CPTII,S$GLB,, | Performed by: UROLOGY

## 2021-07-21 PROCEDURE — 3078F DIAST BP <80 MM HG: CPT | Mod: CPTII,S$GLB,, | Performed by: UROLOGY

## 2021-07-21 PROCEDURE — 3288F FALL RISK ASSESSMENT DOCD: CPT | Mod: CPTII,S$GLB,, | Performed by: UROLOGY

## 2021-07-21 PROCEDURE — 1159F MED LIST DOCD IN RCRD: CPT | Mod: CPTII,S$GLB,, | Performed by: UROLOGY

## 2021-07-21 PROCEDURE — 99213 PR OFFICE/OUTPT VISIT, EST, LEVL III, 20-29 MIN: ICD-10-PCS | Mod: S$GLB,,, | Performed by: UROLOGY

## 2021-07-21 PROCEDURE — 3077F PR MOST RECENT SYSTOLIC BLOOD PRESSURE >= 140 MM HG: ICD-10-PCS | Mod: CPTII,S$GLB,, | Performed by: UROLOGY

## 2022-01-06 ENCOUNTER — OFFICE VISIT (OUTPATIENT)
Dept: INTERNAL MEDICINE | Facility: CLINIC | Age: 82
End: 2022-01-06
Payer: MEDICARE

## 2022-01-06 ENCOUNTER — LAB VISIT (OUTPATIENT)
Dept: LAB | Facility: HOSPITAL | Age: 82
End: 2022-01-06
Attending: INTERNAL MEDICINE
Payer: MEDICARE

## 2022-01-06 VITALS
RESPIRATION RATE: 16 BRPM | TEMPERATURE: 99 F | SYSTOLIC BLOOD PRESSURE: 120 MMHG | OXYGEN SATURATION: 96 % | WEIGHT: 229.25 LBS | HEART RATE: 81 BPM | BODY MASS INDEX: 33.86 KG/M2 | DIASTOLIC BLOOD PRESSURE: 78 MMHG

## 2022-01-06 DIAGNOSIS — E03.4 HYPOTHYROIDISM DUE TO ACQUIRED ATROPHY OF THYROID: ICD-10-CM

## 2022-01-06 DIAGNOSIS — E78.00 HYPERCHOLESTEREMIA: ICD-10-CM

## 2022-01-06 DIAGNOSIS — R97.20 ELEVATED PSA: ICD-10-CM

## 2022-01-06 DIAGNOSIS — R97.20 ELEVATED PSA: Primary | ICD-10-CM

## 2022-01-06 DIAGNOSIS — I10 PRIMARY HYPERTENSION: ICD-10-CM

## 2022-01-06 LAB — COMPLEXED PSA SERPL-MCNC: 26.9 NG/ML (ref 0–4)

## 2022-01-06 PROCEDURE — 99214 OFFICE O/P EST MOD 30 MIN: CPT | Mod: HCNC,S$GLB,, | Performed by: INTERNAL MEDICINE

## 2022-01-06 PROCEDURE — 3078F PR MOST RECENT DIASTOLIC BLOOD PRESSURE < 80 MM HG: ICD-10-PCS | Mod: HCNC,CPTII,S$GLB, | Performed by: INTERNAL MEDICINE

## 2022-01-06 PROCEDURE — 99214 PR OFFICE/OUTPT VISIT, EST, LEVL IV, 30-39 MIN: ICD-10-PCS | Mod: HCNC,S$GLB,, | Performed by: INTERNAL MEDICINE

## 2022-01-06 PROCEDURE — 1159F MED LIST DOCD IN RCRD: CPT | Mod: HCNC,CPTII,S$GLB, | Performed by: INTERNAL MEDICINE

## 2022-01-06 PROCEDURE — 99999 PR PBB SHADOW E&M-EST. PATIENT-LVL III: CPT | Mod: PBBFAC,HCNC,, | Performed by: INTERNAL MEDICINE

## 2022-01-06 PROCEDURE — 84153 ASSAY OF PSA TOTAL: CPT | Mod: HCNC | Performed by: INTERNAL MEDICINE

## 2022-01-06 PROCEDURE — 1159F PR MEDICATION LIST DOCUMENTED IN MEDICAL RECORD: ICD-10-PCS | Mod: HCNC,CPTII,S$GLB, | Performed by: INTERNAL MEDICINE

## 2022-01-06 PROCEDURE — 3074F PR MOST RECENT SYSTOLIC BLOOD PRESSURE < 130 MM HG: ICD-10-PCS | Mod: HCNC,CPTII,S$GLB, | Performed by: INTERNAL MEDICINE

## 2022-01-06 PROCEDURE — 3288F PR FALLS RISK ASSESSMENT DOCUMENTED: ICD-10-PCS | Mod: HCNC,CPTII,S$GLB, | Performed by: INTERNAL MEDICINE

## 2022-01-06 PROCEDURE — 3074F SYST BP LT 130 MM HG: CPT | Mod: HCNC,CPTII,S$GLB, | Performed by: INTERNAL MEDICINE

## 2022-01-06 PROCEDURE — 1101F PT FALLS ASSESS-DOCD LE1/YR: CPT | Mod: HCNC,CPTII,S$GLB, | Performed by: INTERNAL MEDICINE

## 2022-01-06 PROCEDURE — 3078F DIAST BP <80 MM HG: CPT | Mod: HCNC,CPTII,S$GLB, | Performed by: INTERNAL MEDICINE

## 2022-01-06 PROCEDURE — 36415 COLL VENOUS BLD VENIPUNCTURE: CPT | Mod: HCNC | Performed by: INTERNAL MEDICINE

## 2022-01-06 PROCEDURE — 1126F AMNT PAIN NOTED NONE PRSNT: CPT | Mod: HCNC,CPTII,S$GLB, | Performed by: INTERNAL MEDICINE

## 2022-01-06 PROCEDURE — 3288F FALL RISK ASSESSMENT DOCD: CPT | Mod: HCNC,CPTII,S$GLB, | Performed by: INTERNAL MEDICINE

## 2022-01-06 PROCEDURE — 1101F PR PT FALLS ASSESS DOC 0-1 FALLS W/OUT INJ PAST YR: ICD-10-PCS | Mod: HCNC,CPTII,S$GLB, | Performed by: INTERNAL MEDICINE

## 2022-01-06 PROCEDURE — 99999 PR PBB SHADOW E&M-EST. PATIENT-LVL III: ICD-10-PCS | Mod: PBBFAC,HCNC,, | Performed by: INTERNAL MEDICINE

## 2022-01-06 PROCEDURE — 1126F PR PAIN SEVERITY QUANTIFIED, NO PAIN PRESENT: ICD-10-PCS | Mod: HCNC,CPTII,S$GLB, | Performed by: INTERNAL MEDICINE

## 2022-01-06 NOTE — PROGRESS NOTES
Subjective:       Patient ID: Jose Manuel Ford Jr. is a 81 y.o. male.    Chief Complaint: Follow-up, Hypertension, Hyperlipidemia, and Hypothyroidism    Follow-up  Associated symptoms include arthralgias. Pertinent negatives include no abdominal pain, chest pain, chills, coughing, diaphoresis, fatigue, fever, headaches, joint swelling, myalgias, nausea, neck pain, numbness, rash, sore throat, vomiting or weakness.   Hypertension  Pertinent negatives include no chest pain, headaches, neck pain, palpitations or shortness of breath.   Hyperlipidemia  Pertinent negatives include no chest pain, myalgias or shortness of breath.     Past Medical History:   Diagnosis Date    Depression     Elevated alkaline phosphatase level 5/27/2016    HTN (hypertension)     Hypercholesteremia     Hypothyroidism     Obesity     Renal manifestation of secondary diabetes mellitus      No past surgical history on file.  Family History   Problem Relation Age of Onset    Stroke Mother     COPD Father     Cancer Neg Hx     Hearing loss Neg Hx     Kidney disease Neg Hx      Social History     Socioeconomic History    Marital status:    Tobacco Use    Smoking status: Former Smoker    Smokeless tobacco: Former User     Quit date: 1/1/1968   Substance and Sexual Activity    Alcohol use: No     Alcohol/week: 0.0 standard drinks    Drug use: No    Sexual activity: Yes     Review of Systems   Constitutional: Negative for activity change, appetite change, chills, diaphoresis, fatigue, fever and unexpected weight change.   HENT: Negative for drooling, ear discharge, ear pain, facial swelling, hearing loss, mouth sores, nosebleeds, postnasal drip, rhinorrhea, sinus pressure, sneezing, sore throat, tinnitus, trouble swallowing and voice change.    Eyes: Negative for photophobia, redness and visual disturbance.   Respiratory: Negative for apnea, cough, choking, chest tightness, shortness of breath and wheezing.     Cardiovascular: Negative for chest pain, palpitations and leg swelling.   Gastrointestinal: Negative for abdominal distention, abdominal pain, anal bleeding, blood in stool, constipation, diarrhea, nausea and vomiting.   Endocrine: Negative for cold intolerance, heat intolerance, polydipsia, polyphagia and polyuria.   Genitourinary: Negative for difficulty urinating, dysuria, enuresis, flank pain, frequency, genital sores, hematuria and urgency.   Musculoskeletal: Positive for arthralgias. Negative for back pain, gait problem, joint swelling, myalgias, neck pain and neck stiffness.   Skin: Negative for color change, pallor, rash and wound.   Allergic/Immunologic: Negative for food allergies and immunocompromised state.   Neurological: Negative for dizziness, tremors, seizures, syncope, facial asymmetry, speech difficulty, weakness, light-headedness, numbness and headaches.   Hematological: Negative for adenopathy. Does not bruise/bleed easily.   Psychiatric/Behavioral: Negative for agitation, behavioral problems, confusion, decreased concentration, dysphoric mood, hallucinations, self-injury, sleep disturbance and suicidal ideas. The patient is not nervous/anxious and is not hyperactive.        Objective:      Physical Exam  Vitals and nursing note reviewed.   Constitutional:       General: He is not in acute distress.     Appearance: Normal appearance. He is well-developed and well-nourished. He is not diaphoretic.   HENT:      Head: Normocephalic and atraumatic.      Mouth/Throat:      Pharynx: No oropharyngeal exudate.   Eyes:      General: No scleral icterus.     Pupils: Pupils are equal, round, and reactive to light.   Neck:      Thyroid: No thyromegaly.      Vascular: No carotid bruit or JVD.      Trachea: No tracheal deviation.   Cardiovascular:      Rate and Rhythm: Normal rate and regular rhythm.      Pulses: Intact distal pulses.      Heart sounds: Normal heart sounds.   Pulmonary:      Effort: Pulmonary  effort is normal. No respiratory distress.      Breath sounds: Normal breath sounds. No wheezing or rales.   Chest:      Chest wall: No tenderness.   Abdominal:      General: Bowel sounds are normal. There is no distension.      Palpations: Abdomen is soft.      Tenderness: There is no abdominal tenderness. There is no guarding or rebound.   Musculoskeletal:         General: No tenderness or edema. Normal range of motion.      Cervical back: Normal range of motion and neck supple.   Lymphadenopathy:      Cervical: No cervical adenopathy.   Skin:     General: Skin is warm and dry.      Coloration: Skin is not pale.      Findings: No erythema or rash.   Neurological:      Mental Status: He is alert and oriented to person, place, and time.   Psychiatric:         Mood and Affect: Mood and affect normal.         Behavior: Behavior normal.         Thought Content: Thought content normal.         Judgment: Judgment normal.         CMP  Sodium   Date Value Ref Range Status   06/22/2021 139 136 - 145 mmol/L Final     Potassium   Date Value Ref Range Status   06/22/2021 4.0 3.5 - 5.1 mmol/L Final     Chloride   Date Value Ref Range Status   06/22/2021 108 95 - 110 mmol/L Final     CO2   Date Value Ref Range Status   06/22/2021 21 (L) 23 - 29 mmol/L Final     Glucose   Date Value Ref Range Status   06/22/2021 103 70 - 110 mg/dL Final     BUN   Date Value Ref Range Status   06/22/2021 15 8 - 23 mg/dL Final     Creatinine   Date Value Ref Range Status   06/22/2021 1.1 0.5 - 1.4 mg/dL Final     Calcium   Date Value Ref Range Status   06/22/2021 9.7 8.7 - 10.5 mg/dL Final     Total Protein   Date Value Ref Range Status   06/22/2021 7.7 6.0 - 8.4 g/dL Final     Albumin   Date Value Ref Range Status   06/22/2021 4.3 3.5 - 5.2 g/dL Final   09/06/2016 4.2 3.6 - 5.1 g/dL Final     Comment:     @ Test Performed By:  ReDigi Mckeon Mayela Becerril M.D., TON[place].,   [address] [place]  [address]  [facility]  [id number]       Total Bilirubin   Date Value Ref Range Status   06/22/2021 0.6 0.1 - 1.0 mg/dL Final     Comment:     For infants and newborns, interpretation of results should be based  on gestational age, weight and in agreement with clinical  observations.    Premature Infant recommended reference ranges:  Up to 24 hours.............<8.0 mg/dL  Up to 48 hours............<12.0 mg/dL  3-5 days..................<15.0 mg/dL  6-29 days.................<15.0 mg/dL       Alkaline Phosphatase   Date Value Ref Range Status   06/22/2021 127 55 - 135 U/L Final     AST   Date Value Ref Range Status   06/22/2021 18 10 - 40 U/L Final     ALT   Date Value Ref Range Status   06/22/2021 19 10 - 44 U/L Final     Anion Gap   Date Value Ref Range Status   06/22/2021 10 8 - 16 mmol/L Final     eGFR if    Date Value Ref Range Status   06/22/2021 >60.0 >60 mL/min/1.73 m^2 Final     eGFR if non    Date Value Ref Range Status   06/22/2021 >60.0 >60 mL/min/1.73 m^2 Final     Comment:     Calculation used to obtain the estimated glomerular filtration  rate (eGFR) is the CKD-EPI equation.        Lab Results   Component Value Date    WBC 4.94 06/22/2021    HGB 14.6 06/22/2021    HCT 42.9 06/22/2021    MCV 92 06/22/2021     06/22/2021     Lab Results   Component Value Date    CHOL 269 (H) 06/22/2021     Lab Results   Component Value Date    HDL 48 06/22/2021     Lab Results   Component Value Date    LDLCALC 186.4 (H) 06/22/2021     Lab Results   Component Value Date    TRIG 173 (H) 06/22/2021     Lab Results   Component Value Date    CHOLHDL 17.8 (L) 06/22/2021     Lab Results   Component Value Date    TSH 6.645 (H) 06/22/2021     Lab Results   Component Value Date    HGBA1C 5.7 08/21/2014     Assessment:       1. Elevated PSA    2. Primary hypertension    3. Hypercholesteremia    4. Hypothyroidism due to acquired atrophy of thyroid        Plan:   Elevated PSA---------------------f/u  urology------------------  -     Prostate Specific Antigen, Diagnostic; Future; Expected date: 01/06/2022    Primary hypertension--------------------doing ok-------------    Hypercholesteremia-----------------declines meds--------------    Hypothyroidism due to acquired atrophy of thyroid----------------------declines med--------------    As above-------------------f/u 6 months-------------

## 2022-01-07 ENCOUNTER — TELEPHONE (OUTPATIENT)
Dept: INTERNAL MEDICINE | Facility: CLINIC | Age: 82
End: 2022-01-07
Payer: MEDICARE

## 2022-01-31 ENCOUNTER — OFFICE VISIT (OUTPATIENT)
Dept: UROLOGY | Facility: CLINIC | Age: 82
End: 2022-01-31
Payer: MEDICARE

## 2022-01-31 VITALS — BODY MASS INDEX: 33.89 KG/M2 | SYSTOLIC BLOOD PRESSURE: 181 MMHG | DIASTOLIC BLOOD PRESSURE: 91 MMHG | WEIGHT: 229.5 LBS

## 2022-01-31 DIAGNOSIS — R97.20 ELEVATED PSA: Primary | ICD-10-CM

## 2022-01-31 PROCEDURE — 1101F PR PT FALLS ASSESS DOC 0-1 FALLS W/OUT INJ PAST YR: ICD-10-PCS | Mod: HCNC,CPTII,S$GLB, | Performed by: UROLOGY

## 2022-01-31 PROCEDURE — 1101F PT FALLS ASSESS-DOCD LE1/YR: CPT | Mod: HCNC,CPTII,S$GLB, | Performed by: UROLOGY

## 2022-01-31 PROCEDURE — 99213 OFFICE O/P EST LOW 20 MIN: CPT | Mod: HCNC,S$GLB,, | Performed by: UROLOGY

## 2022-01-31 PROCEDURE — 1159F PR MEDICATION LIST DOCUMENTED IN MEDICAL RECORD: ICD-10-PCS | Mod: HCNC,CPTII,S$GLB, | Performed by: UROLOGY

## 2022-01-31 PROCEDURE — 3077F PR MOST RECENT SYSTOLIC BLOOD PRESSURE >= 140 MM HG: ICD-10-PCS | Mod: HCNC,CPTII,S$GLB, | Performed by: UROLOGY

## 2022-01-31 PROCEDURE — 1126F PR PAIN SEVERITY QUANTIFIED, NO PAIN PRESENT: ICD-10-PCS | Mod: HCNC,CPTII,S$GLB, | Performed by: UROLOGY

## 2022-01-31 PROCEDURE — 1126F AMNT PAIN NOTED NONE PRSNT: CPT | Mod: HCNC,CPTII,S$GLB, | Performed by: UROLOGY

## 2022-01-31 PROCEDURE — 3077F SYST BP >= 140 MM HG: CPT | Mod: HCNC,CPTII,S$GLB, | Performed by: UROLOGY

## 2022-01-31 PROCEDURE — 99999 PR PBB SHADOW E&M-EST. PATIENT-LVL III: ICD-10-PCS | Mod: PBBFAC,HCNC,, | Performed by: UROLOGY

## 2022-01-31 PROCEDURE — 3080F PR MOST RECENT DIASTOLIC BLOOD PRESSURE >= 90 MM HG: ICD-10-PCS | Mod: HCNC,CPTII,S$GLB, | Performed by: UROLOGY

## 2022-01-31 PROCEDURE — 3288F PR FALLS RISK ASSESSMENT DOCUMENTED: ICD-10-PCS | Mod: HCNC,CPTII,S$GLB, | Performed by: UROLOGY

## 2022-01-31 PROCEDURE — 99999 PR PBB SHADOW E&M-EST. PATIENT-LVL III: CPT | Mod: PBBFAC,HCNC,, | Performed by: UROLOGY

## 2022-01-31 PROCEDURE — 3080F DIAST BP >= 90 MM HG: CPT | Mod: HCNC,CPTII,S$GLB, | Performed by: UROLOGY

## 2022-01-31 PROCEDURE — 3288F FALL RISK ASSESSMENT DOCD: CPT | Mod: HCNC,CPTII,S$GLB, | Performed by: UROLOGY

## 2022-01-31 PROCEDURE — 1159F MED LIST DOCD IN RCRD: CPT | Mod: HCNC,CPTII,S$GLB, | Performed by: UROLOGY

## 2022-01-31 PROCEDURE — 99213 PR OFFICE/OUTPT VISIT, EST, LEVL III, 20-29 MIN: ICD-10-PCS | Mod: HCNC,S$GLB,, | Performed by: UROLOGY

## 2022-01-31 NOTE — PROGRESS NOTES
Chief Complaint   Patient presents with    Other         History of Present Illness:   Jose Manuel Ford Jr. is a 81 y.o. male here for follow up.   1/31/22-PSA up again. Pt now interested in MRI. Pt denies any bothersome LUTS. Good stream. No gross hematuria.   7/21/21-PSA continues to rise. Urinary stream is good. No stranguria. No dysuria or gross hematuria. No urgency.   2/10/20: PSA has risen substantially.  Reviewed history in detail. Recc MRI/BIopsy and he is not wanting to do it feeling fine; discussed in detail  9/19/18: PSA not done yet.  Viagra working.  9/11/17: No complaints, voiding well.  PSA up a bit but still in his normal band.  Cialis too expensive here [place]  9/9/16: Doing well no complaints.  Uses half a cialis sporadically.  Is on a weight loss diet he likes and his PSA is up - he says he was on a diet when his PSA was up last time.  9/3/15: Here to discuss low T.  T was low end of normal when checked. Tried the cialis and it worked fine but he got some muscle cramping a few days later and it has gotten better since.  Hasn't setup a sleep study yet. Discussed T findings in detail and discussed risks/benefits of T therapy.  8/24/15: Returns for followup with PSA of 4.9 in 2/15; was 9.2 around the time of biopsy 2/13. Complains of a bump on the superior portion of his penis.  At night he has nocturia x3-4; snores but not bad he thinks.  Coffee in AM, tea sometimes during the day, diet coke in late afternoon.  No leakage or sudden urgency.  Usually has a good stream.  Has some ED and is concerned about low T.  Has a few beers at night; we discussed links for food and ETOH and impact on ED.  2/13: Pt had a recent TRUS/Bx with negative results. He has done a lot of reading and has a lot of questions regarding the need for biopsy and his results. We discussed  these for some time, including biopsy indications, PSA levels and their interpretation, potential false  negatives, and management going forward. He felt crappy for 3 days.  He got better after that. He has had some resolving hematospermia and one episode of hematuria.    Review of Systems   Constitutional: Negative for chills and fever.   Respiratory: Negative for shortness of breath.    Cardiovascular: Negative for chest pain.   Gastrointestinal: Negative for abdominal pain.   Musculoskeletal: Negative for back pain.   Neurological: Negative for numbness.   All other systems reviewed and are negative.      Current Outpatient Medications   Medication Sig Dispense Refill    omeprazole (PRILOSEC) 20 MG capsule TAKE 1 CAPSULE BY MOUTH EVERY DAY (Patient not taking: Reported on 1/31/2022) 30 capsule 6     No current facility-administered medications for this visit.       Review of patient's allergies indicates:  No Known Allergies    Past medical, family, and social history reviewed as documented in chart with pertinent positive medical, family, and social history detailed in HPI.    Physical Exam  Vitals:    01/31/22 1133   BP: (!) 181/91       General: Well-developed, well-nourished, in no acute distress  HEENT: Normocephalic, atraumatic, extraocular eye movements in tact  Neck: supple, trachea midline, no cervical or supraclavicular adenopathy  Respirations: Even and unlabored  Abdomen: soft, Non-tender, Non-distended, no palpable masses, no rebound or guarding  Rectal: 40gram prostate without nodules or tenderness. Somewhat firm. No gross blood.   Extremities: Moves all extremities equally, atraumatic, no clubbing, cyanosis, edema  Skin: warm and dry, no lesions  Psych: normal affect  Neuro: Alert and oriented x 3. Cranial nerves II-XII grossly intact    Urinalysis  Color, UA   Date Value Ref Range Status   02/10/2020 yellow  Final     pH, UA   Date Value Ref Range Status   02/10/2020 7  Final     Spec Grav UA   Date Value Ref Range Status   02/10/2020 1.005  Final     Nitrite, UA   Date Value Ref Range Status    02/10/2020 neg  Final       PSA    9/19/18 8.9  11/20/19 11.8  2/6/20 14.8  6/22/21 18.3  1/6/22 26.9      Assessment:   1. Elevated PSA  POCT URINE DIPSTICK WITHOUT MICROSCOPE    MRI Prostate W W/O Contrast         Plan:  Elevated PSA  -     POCT URINE DIPSTICK WITHOUT MICROSCOPE  -     MRI Prostate W W/O Contrast; Future; Expected date: 01/31/2022      Pt now interested in MRI prostate. May consider biopsy.   Will call with MRI results.

## 2022-02-22 ENCOUNTER — TELEPHONE (OUTPATIENT)
Dept: UROLOGY | Facility: CLINIC | Age: 82
End: 2022-02-22
Payer: MEDICARE

## 2022-02-22 DIAGNOSIS — R97.20 ELEVATED PSA: Primary | ICD-10-CM

## 2022-02-22 NOTE — TELEPHONE ENCOUNTER
Called pt, no answer, left message for pt to contact office. If pt calls back pt needs a stat creatinine before his MRI on 3/3/22.

## 2022-02-22 NOTE — TELEPHONE ENCOUNTER
----- Message from Paula Butts, RT sent at 2/22/2022 11:29 AM CST -----  Regarding: Creatinine    Hello,  Pt with MRN: [[id number]] has no recent creatinine value on file for the MRI - Prostate on 03/03/22. Please order and schedule lab work (creatinine) for this pt.  Thank you.  Kimberli.

## 2022-02-25 DIAGNOSIS — K21.9 GASTROESOPHAGEAL REFLUX DISEASE: ICD-10-CM

## 2022-02-25 NOTE — TELEPHONE ENCOUNTER
No new care gaps identified.  Powered by [employer]. Reference number: [id number].   2/25/2022 3:37:07 AM CST

## 2022-03-01 RX ORDER — OMEPRAZOLE 20 MG/1
CAPSULE, DELAYED RELEASE ORAL
Qty: 90 CAPSULE | Refills: 3 | Status: SHIPPED | OUTPATIENT
Start: 2022-03-01 | End: 2023-01-06 | Stop reason: SDUPTHER

## 2022-03-01 NOTE — TELEPHONE ENCOUNTER
Refill Authorization Note   Jose Manuel Ford  is requesting a refill authorization.  Brief Assessment and Rationale for Refill:  Approve     Medication Therapy Plan:       Medication Reconciliation Completed: No   Comments:   --->Care Gap information included below if applicable.       Requested Prescriptions   Pending Prescriptions Disp Refills    omeprazole (PRILOSEC) 20 MG capsule [Pharmacy Med Name: OMEPRAZOLE 20MG CAPSULES] 90 capsule 3     Sig: TAKE 1 CAPSULE BY MOUTH EVERY DAY       Gastroenterology: Proton Pump Inhibitors Passed - 3/1/2022 12:57 PM        Passed - Patient is at least 18 years old        Passed - Osteoporosis is not on problem list        Passed - Plavix is not on active medication list        Passed - An appropriate indication is on the problem list        Passed - Valid encounter within last 15 months     Recent Visits  Date Type Provider Dept   01/06/22 Office Visit Itz Welch MD [facility] Internal Medicine   06/22/21 Office Visit Itz Welch MD [facility]   Showing recent visits within past 720 days and meeting all other requirements  Future Appointments  No visits were found meeting these conditions.  Showing future appointments within next 150 days and meeting all other requirements      Future Appointments              In 2 days LABORATORY, [facility], [facility]    In 4 months Itz Welch MD [facility]                    Appointments  past 12m or future 3m with PCP    Date Provider   Last Visit   1/6/2022 Itz Welch MD   Next Visit   7/6/2022 Itz Welch MD   ED visits in past 90 days: 0     Note composed:12:58 PM 03/01/2022

## 2022-03-03 ENCOUNTER — HOSPITAL ENCOUNTER (OUTPATIENT)
Dept: RADIOLOGY | Facility: HOSPITAL | Age: 82
Discharge: HOME | End: 2022-03-03
Attending: UROLOGY
Payer: MEDICARE

## 2022-03-03 DIAGNOSIS — R97.20 ELEVATED PSA: ICD-10-CM

## 2022-03-03 PROCEDURE — 72197 MRI PELVIS W/O & W/DYE: CPT | Mod: TC,HCNC

## 2022-03-03 PROCEDURE — 72197 MRI PROSTATE W W/O CONTRAST: ICD-10-PCS | Mod: 26,HCNC,, | Performed by: RADIOLOGY

## 2022-03-03 PROCEDURE — 72197 MRI PELVIS W/O & W/DYE: CPT | Mod: 26,HCNC,, | Performed by: RADIOLOGY

## 2022-03-03 PROCEDURE — A9585 GADOBUTROL INJECTION: HCPCS | Mod: HCNC | Performed by: UROLOGY

## 2022-03-03 PROCEDURE — 25500020 PHARM REV CODE 255: Mod: HCNC | Performed by: UROLOGY

## 2022-03-03 RX ORDER — GADOBUTROL 604.72 MG/ML
10 INJECTION INTRAVENOUS
Status: COMPLETED | OUTPATIENT
Start: 2022-03-03 | End: 2022-03-03

## 2022-03-03 RX ADMIN — GADOBUTROL 10 ML: 604.72 INJECTION INTRAVENOUS at 01:03

## 2022-03-09 ENCOUNTER — TELEPHONE (OUTPATIENT)
Dept: UROLOGY | Facility: HOSPITAL | Age: 82
End: 2022-03-09
Payer: MEDICARE

## 2022-03-13 ENCOUNTER — PATIENT OUTREACH (OUTPATIENT)
Dept: ADMINISTRATIVE | Facility: OTHER | Age: 82
End: 2022-03-13
Payer: MEDICARE

## 2022-03-14 ENCOUNTER — OFFICE VISIT (OUTPATIENT)
Dept: UROLOGY | Facility: CLINIC | Age: 82
End: 2022-03-14
Payer: MEDICARE

## 2022-03-14 VITALS
WEIGHT: 226.63 LBS | SYSTOLIC BLOOD PRESSURE: 160 MMHG | DIASTOLIC BLOOD PRESSURE: 83 MMHG | BODY MASS INDEX: 33.47 KG/M2

## 2022-03-14 DIAGNOSIS — R97.20 ELEVATED PSA: Primary | ICD-10-CM

## 2022-03-14 PROCEDURE — 99999 PR PBB SHADOW E&M-EST. PATIENT-LVL II: CPT | Mod: PBBFAC,,, | Performed by: UROLOGY

## 2022-03-14 PROCEDURE — 1159F PR MEDICATION LIST DOCUMENTED IN MEDICAL RECORD: ICD-10-PCS | Mod: CPTII,S$GLB,, | Performed by: UROLOGY

## 2022-03-14 PROCEDURE — 3079F DIAST BP 80-89 MM HG: CPT | Mod: CPTII,S$GLB,, | Performed by: UROLOGY

## 2022-03-14 PROCEDURE — 99213 PR OFFICE/OUTPT VISIT, EST, LEVL III, 20-29 MIN: ICD-10-PCS | Mod: S$GLB,,, | Performed by: UROLOGY

## 2022-03-14 PROCEDURE — 3288F PR FALLS RISK ASSESSMENT DOCUMENTED: ICD-10-PCS | Mod: CPTII,S$GLB,, | Performed by: UROLOGY

## 2022-03-14 PROCEDURE — 1126F PR PAIN SEVERITY QUANTIFIED, NO PAIN PRESENT: ICD-10-PCS | Mod: CPTII,S$GLB,, | Performed by: UROLOGY

## 2022-03-14 PROCEDURE — 1101F PT FALLS ASSESS-DOCD LE1/YR: CPT | Mod: CPTII,S$GLB,, | Performed by: UROLOGY

## 2022-03-14 PROCEDURE — 99213 OFFICE O/P EST LOW 20 MIN: CPT | Mod: S$GLB,,, | Performed by: UROLOGY

## 2022-03-14 PROCEDURE — 3077F SYST BP >= 140 MM HG: CPT | Mod: CPTII,S$GLB,, | Performed by: UROLOGY

## 2022-03-14 PROCEDURE — 1159F MED LIST DOCD IN RCRD: CPT | Mod: CPTII,S$GLB,, | Performed by: UROLOGY

## 2022-03-14 PROCEDURE — 99999 PR PBB SHADOW E&M-EST. PATIENT-LVL II: ICD-10-PCS | Mod: PBBFAC,,, | Performed by: UROLOGY

## 2022-03-14 PROCEDURE — 3077F PR MOST RECENT SYSTOLIC BLOOD PRESSURE >= 140 MM HG: ICD-10-PCS | Mod: CPTII,S$GLB,, | Performed by: UROLOGY

## 2022-03-14 PROCEDURE — 1101F PR PT FALLS ASSESS DOC 0-1 FALLS W/OUT INJ PAST YR: ICD-10-PCS | Mod: CPTII,S$GLB,, | Performed by: UROLOGY

## 2022-03-14 PROCEDURE — 1126F AMNT PAIN NOTED NONE PRSNT: CPT | Mod: CPTII,S$GLB,, | Performed by: UROLOGY

## 2022-03-14 PROCEDURE — 3288F FALL RISK ASSESSMENT DOCD: CPT | Mod: CPTII,S$GLB,, | Performed by: UROLOGY

## 2022-03-14 PROCEDURE — 3079F PR MOST RECENT DIASTOLIC BLOOD PRESSURE 80-89 MM HG: ICD-10-PCS | Mod: CPTII,S$GLB,, | Performed by: UROLOGY

## 2022-03-14 RX ORDER — LEVOFLOXACIN 500 MG/1
TABLET, FILM COATED ORAL
Qty: 1 TABLET | Refills: 0 | Status: SHIPPED | OUTPATIENT
Start: 2022-03-14 | End: 2022-07-06

## 2022-03-17 NOTE — PROGRESS NOTES
CC: follow up MRI    History of Present Illness:   Jose Manuel Ford Jr. is a 81 y.o. male here for follow up.     3/14/22-MRI personally reviewed, and reviewed with pt. 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 Pt still without bothersome LUTS. No gross hematuria.   1/31/22-PSA up again. Pt now interested in MRI. Pt denies any bothersome LUTS. Good stream. No gross hematuria.   7/21/21-PSA continues to rise. Urinary stream is good. No stranguria. No dysuria or gross hematuria. No urgency.   2/10/20: PSA has risen substantially.  Reviewed history in detail. Recc MRI/BIopsy and he is not wanting to do it feeling fine; discussed in detail  9/19/18: PSA not done yet.  Viagra working.  9/11/17: No complaints, voiding well.  PSA up a bit but still in his normal band.  Cialis too expensive here [place]  9/9/16: Doing well no complaints.  Uses half a cialis sporadically.  Is on a weight loss diet he likes and his PSA is up - he says he was on a diet when his PSA was up last time.  9/3/15: Here to discuss low T.  T was low end of normal when checked. Tried the cialis and it worked fine but he got some muscle cramping a few days later and it has gotten better since.  Hasn't setup a sleep study yet. Discussed T findings in detail and discussed risks/benefits of T therapy.  8/24/15: Returns for followup with PSA of 4.9 in 2/15; was 9.2 around the time of biopsy 2/13. Complains of a bump on the superior portion of his penis.  At night he has nocturia x3-4; snores but not bad he thinks.  Coffee in AM, tea sometimes during the day, diet coke in late afternoon.  No leakage or sudden urgency.  Usually has a good stream.  Has some ED and is concerned about low T.  Has a few beers at night; we discussed links for food and ETOH and impact on ED.  2/13: Pt had a recent TRUS/Bx with negative results. He has done a lot of reading and has a lot of  questions regarding the need for biopsy and his results. We discussed  these for some time, including biopsy indications, PSA levels and their interpretation, potential false negatives, and management going forward. He felt crappy for 3 days.  He got better after that. He has had some resolving hematospermia and one episode of hematuria.    Review of Systems   Constitutional: Negative for chills and fever.   Respiratory: Negative for shortness of breath.    Cardiovascular: Negative for chest pain.   Gastrointestinal: Negative for abdominal pain.   Musculoskeletal: Negative for back pain.   Neurological: Negative for numbness.   All other systems reviewed and are negative.      Current Outpatient Medications   Medication Sig Dispense Refill    levoFLOXacin (LEVAQUIN) 500 MG tablet Take 1 po 1 hour before biopsy 1 tablet 0    omeprazole (PRILOSEC) 20 MG capsule TAKE 1 CAPSULE BY MOUTH EVERY DAY 90 capsule 3     No current facility-administered medications for this visit.       Review of patient's allergies indicates:  No Known Allergies    Past medical, family, and social history reviewed as documented in chart with pertinent positive medical, family, and social history detailed in HPI.    Physical Exam  Vitals:    03/14/22 1151   BP: (!) 160/83       General: Well-developed, well-nourished, in no acute distress  HEENT: Normocephalic, atraumatic, extraocular eye movements in tact  Neck: supple, trachea midline, no cervical or supraclavicular adenopathy  Respirations: Even and unlabored  Abdomen: soft, Non-tender, Non-distended, no palpable masses, no rebound or guarding  Rectal: 1/31/22-40gram prostate without nodules or tenderness. Somewhat firm. No gross blood.   Extremities: Moves all extremities equally, atraumatic, no clubbing, cyanosis, edema  Skin: warm and dry, no lesions  Psych: normal affect  Neuro: Alert and oriented x 3. Cranial nerves II-XII grossly intact    Urinalysis  Color, UA   Date Value Ref Range  Status   02/10/2020 yellow  Final     pH, UA   Date Value Ref Range Status   02/10/2020 7  Final     Spec Grav UA   Date Value Ref Range Status   02/10/2020 1.005  Final     Nitrite, UA   Date Value Ref Range Status   02/10/2020 neg  Final       PSA    9/19/18 8.9  11/20/19 11.8  2/6/20 14.8  6/22/21 18.3  1/6/22 26.9      Assessment:   1. Elevated PSA           Plan:  Elevated PSA    Other orders  -     levoFLOXacin (LEVAQUIN) 500 MG tablet; Take 1 po 1 hour before biopsy  Dispense: 1 tablet; Refill: 0    Discussed MRI in detail with pt. Risks/benefits of biopsy discussed, including infection and bleeding risks. Pt is a [employer] and understands the potential risk of bleeding and need for transfusion, but declines any blood products, should catastrophic bleeding occur. He would like to proceed with MR/US fusion biopsy, given MRI results.     Follow up for MRI/US fusion biopsy.

## 2022-04-18 ENCOUNTER — TELEPHONE (OUTPATIENT)
Dept: UROLOGY | Facility: CLINIC | Age: 82
End: 2022-04-18
Payer: MEDICARE

## 2022-04-18 ENCOUNTER — PATIENT MESSAGE (OUTPATIENT)
Dept: UROLOGY | Facility: CLINIC | Age: 82
End: 2022-04-18
Payer: MEDICARE

## 2022-04-18 NOTE — TELEPHONE ENCOUNTER
Dr. David, this pt is supposed to have the US fusion biopsy (UroNav) but of course we know what the situation is with that.   Should he be scheduled for a regular biopsy instead?

## 2022-04-18 NOTE — TELEPHONE ENCOUNTER
I called and spoke to pt regarding scheduling his prostate biopsy. Pt had his MRI and the plan was for him to have a fusion (UroNav) biopsy. However, at this time we do not have staff trained to assist with the procedure so as per Dr. David's recommendation, it was appropriate to schedule procedure as a regular TRUS biopsy. Pt stated that he wanted to do some research and check with some local urology clinics to see if they had the ability to perform the UroNav biopsy. Pt stated that he would call us back should he decide to do there TRUS biopsy.

## 2022-04-20 ENCOUNTER — PATIENT MESSAGE (OUTPATIENT)
Dept: FAMILY MEDICINE | Facility: CLINIC | Age: 82
End: 2022-04-20
Payer: MEDICARE

## 2022-04-20 DIAGNOSIS — R97.20 PSA ELEVATION: Primary | ICD-10-CM

## 2022-04-26 NOTE — PROGRESS NOTES
"Jose Manuel Ford presented for a  Medicare AWV and comprehensive Health Risk Assessment today. The following components were reviewed and updated:    · Medical history  · Family History  · Social history  · Allergies and Current Medications  · Health Risk Assessment  · Health Maintenance  · Care Team     ** See Completed Assessments for Annual Wellness Visit within the encounter summary.**       The following assessments were completed:  · Living Situation  · CAGE  · Depression Screening  · Timed Get Up and Go  · Whisper Test  · Cognitive Function Screening  · Nutrition Screening  · ADL Screening  · PAQ Screening    Vitals:    08/31/17 0914   BP: 130/72   BP Location: Left arm   Patient Position: Sitting   Pulse: 62   SpO2: 98%   Weight: 105 kg (231 lb 7.7 oz)   Height: 5' 9" (1.753 m)     Body mass index is 34.18 kg/m².  Physical Exam   Constitutional: He appears well-developed. No distress.   HENT:   Head: Normocephalic and atraumatic.   Eyes: Pupils are equal, round, and reactive to light.   Neck: Carotid bruit is not present.   Cardiovascular: Normal rate, regular rhythm, normal heart sounds, intact distal pulses and normal pulses.  Exam reveals no gallop.    No murmur heard.  Pulmonary/Chest: Effort normal and breath sounds normal.   Abdominal: Soft. Bowel sounds are normal. He exhibits no distension. There is no tenderness.   Musculoskeletal: Normal range of motion. He exhibits no edema.   Neurological: He exhibits normal muscle tone. Gait normal.   Skin: Skin is warm, dry and intact.   Psychiatric: He has a normal mood and affect. His speech is normal and behavior is normal. Judgment and thought content normal. Cognition and memory are normal.   Nursing note and vitals reviewed.        Diagnoses and health risks identified today and associated recommendations/orders:    1. Encounter for preventive health examination    2. Essential hypertension  Stable and controlled on Norvasc daily. Continue current treatment " plan as previously prescribed with your PCP- Yuri    3. Hypercholesteremia  Component      Latest Ref Rng & Units 5/15/2017   Cholesterol      120 - 199 mg/dL 196   Triglycerides      30 - 150 mg/dL 219 (H)   HDL      40 - 75 mg/dL 44   LDL Cholesterol      63.0 - 159.0 mg/dL 108.2   HDL/Chol Ratio      20.0 - 50.0 % 22.4   Total Cholesterol/HDL Ratio      2.0 - 5.0 4.5   This problem is currently not controlled on Lipitor daily . Please follow up with your PCP as planned to discuss adjustments to your treatment plan    4. Hypothyroidism due to acquired atrophy of thyroid  Component      Latest Ref Rng & Units 5/15/2017   TSH      0.400 - 4.000 uIU/mL 6.273 (H)   Chronic and ongoing problem . Now on Thyroid Pork daily. Followed by PCP     5. Gastroesophageal reflux disease without esophagitis  Stable and controlled on Priolesc daily . Continue current treatment plan as previously prescribed with your PCP.     6. Benign prostatic hyperplasia without lower urinary tract symptoms  Component      Latest Ref Rng & Units 9/6/2016 4/13/2016   PSA, SCREEN      0.00 - 4.00 ng/mL 6.3 (H) 5.5 (H)   Pt report chronic hx of elevated PSA- TURP/biopsy done  2/13 neg findings  Pt started Flomax  Stopped- states no urinary problems at this time. Stable. Continue current treatment plan as previously prescribed with your urologist,Dr Nelson    7. Chronic kidney disease, stage III (moderate)  Component      Latest Ref Rng & Units 5/15/2017   eGFR if non African American      >60 mL/min/1.73 m:2 58.8 (A)   Stable and controlled renal function . Continue current treatment plan as previously prescribed with your PCP.     8. Fatty liver disease, nonalcoholic   Liver is echogenic suggesting fatty infiltration noted per abdomen u/s 5/13/16- pt reports a fatty diet and no exercise- denies abdomen pain   Chronic and stable. Followed by PCP    9. Obesity (BMI 30.0-34.9)  BMI 34.18. States no diet or exercise plan  This problem is  currently not controlled. Please follow up with your PCP as planned to discuss adjustments to your treatment plan    Provided Jose Manuel with a 5-10 year written screening schedule and personal prevention plan. Recommendations were developed using the USPSTF age appropriate recommendations. Education, counseling, and referrals were provided as needed. After Visit Summary printed and given to patient which includes a list of additional screenings\tests needed.    Return in about 1 year (around 8/31/2018).    Katherin Paul NP   Patient/Caregiver provided printed discharge information.

## 2022-07-06 ENCOUNTER — OFFICE VISIT (OUTPATIENT)
Dept: FAMILY MEDICINE | Facility: CLINIC | Age: 82
End: 2022-07-06
Payer: MEDICARE

## 2022-07-06 ENCOUNTER — LAB VISIT (OUTPATIENT)
Dept: LAB | Facility: HOSPITAL | Age: 82
End: 2022-07-06
Attending: INTERNAL MEDICINE
Payer: MEDICARE

## 2022-07-06 VITALS
RESPIRATION RATE: 16 BRPM | SYSTOLIC BLOOD PRESSURE: 130 MMHG | DIASTOLIC BLOOD PRESSURE: 70 MMHG | BODY MASS INDEX: 30.02 KG/M2 | TEMPERATURE: 98 F | HEART RATE: 75 BPM | WEIGHT: 203.25 LBS

## 2022-07-06 DIAGNOSIS — I10 PRIMARY HYPERTENSION: ICD-10-CM

## 2022-07-06 DIAGNOSIS — E78.00 HYPERCHOLESTEREMIA: ICD-10-CM

## 2022-07-06 DIAGNOSIS — E03.4 HYPOTHYROIDISM DUE TO ACQUIRED ATROPHY OF THYROID: ICD-10-CM

## 2022-07-06 DIAGNOSIS — C61 PROSTATE CANCER: ICD-10-CM

## 2022-07-06 DIAGNOSIS — E03.4 HYPOTHYROIDISM DUE TO ACQUIRED ATROPHY OF THYROID: Primary | ICD-10-CM

## 2022-07-06 LAB
ALBUMIN SERPL BCP-MCNC: 4.4 G/DL (ref 3.5–5.2)
ALP SERPL-CCNC: 135 U/L (ref 55–135)
ALT SERPL W/O P-5'-P-CCNC: 19 U/L (ref 10–44)
ANION GAP SERPL CALC-SCNC: 12 MMOL/L (ref 8–16)
AST SERPL-CCNC: 25 U/L (ref 10–40)
BASOPHILS # BLD AUTO: 0.01 K/UL (ref 0–0.2)
BASOPHILS NFR BLD: 0.2 % (ref 0–1.9)
BILIRUB SERPL-MCNC: 0.6 MG/DL (ref 0.1–1)
BUN SERPL-MCNC: 24 MG/DL (ref 8–23)
CALCIUM SERPL-MCNC: 9.9 MG/DL (ref 8.7–10.5)
CHLORIDE SERPL-SCNC: 105 MMOL/L (ref 95–110)
CHOLEST SERPL-MCNC: 319 MG/DL (ref 120–199)
CHOLEST/HDLC SERPL: 5.2 {RATIO} (ref 2–5)
CO2 SERPL-SCNC: 22 MMOL/L (ref 23–29)
COMPLEXED PSA SERPL-MCNC: 3.7 NG/ML (ref 0–4)
CREAT SERPL-MCNC: 1.3 MG/DL (ref 0.5–1.4)
DIFFERENTIAL METHOD: ABNORMAL
EOSINOPHIL # BLD AUTO: 0.1 K/UL (ref 0–0.5)
EOSINOPHIL NFR BLD: 2.1 % (ref 0–8)
ERYTHROCYTE [DISTWIDTH] IN BLOOD BY AUTOMATED COUNT: 14.1 % (ref 11.5–14.5)
EST. GFR  (AFRICAN AMERICAN): 59.6 ML/MIN/1.73 M^2
EST. GFR  (NON AFRICAN AMERICAN): 51.5 ML/MIN/1.73 M^2
GLUCOSE SERPL-MCNC: 105 MG/DL (ref 70–110)
HCT VFR BLD AUTO: 42.1 % (ref 40–54)
HDLC SERPL-MCNC: 61 MG/DL (ref 40–75)
HDLC SERPL: 19.1 % (ref 20–50)
HGB BLD-MCNC: 13.9 G/DL (ref 14–18)
IMM GRANULOCYTES # BLD AUTO: 0.01 K/UL (ref 0–0.04)
IMM GRANULOCYTES NFR BLD AUTO: 0.2 % (ref 0–0.5)
LDLC SERPL CALC-MCNC: 215 MG/DL (ref 63–159)
LYMPHOCYTES # BLD AUTO: 1.4 K/UL (ref 1–4.8)
LYMPHOCYTES NFR BLD: 27.3 % (ref 18–48)
MCH RBC QN AUTO: 31.4 PG (ref 27–31)
MCHC RBC AUTO-ENTMCNC: 33 G/DL (ref 32–36)
MCV RBC AUTO: 95 FL (ref 82–98)
MONOCYTES # BLD AUTO: 0.4 K/UL (ref 0.3–1)
MONOCYTES NFR BLD: 8.4 % (ref 4–15)
NEUTROPHILS # BLD AUTO: 3.2 K/UL (ref 1.8–7.7)
NEUTROPHILS NFR BLD: 61.8 % (ref 38–73)
NONHDLC SERPL-MCNC: 258 MG/DL
NRBC BLD-RTO: 0 /100 WBC
PLATELET # BLD AUTO: 208 K/UL (ref 150–450)
PMV BLD AUTO: 12 FL (ref 9.2–12.9)
POTASSIUM SERPL-SCNC: 4.8 MMOL/L (ref 3.5–5.1)
PROT SERPL-MCNC: 8 G/DL (ref 6–8.4)
RBC # BLD AUTO: 4.42 M/UL (ref 4.6–6.2)
SODIUM SERPL-SCNC: 139 MMOL/L (ref 136–145)
T4 FREE SERPL-MCNC: 0.92 NG/DL (ref 0.71–1.51)
TRIGL SERPL-MCNC: 215 MG/DL (ref 30–150)
TSH SERPL DL<=0.005 MIU/L-ACNC: 4.85 UIU/ML (ref 0.4–4)
WBC # BLD AUTO: 5.13 K/UL (ref 3.9–12.7)

## 2022-07-06 PROCEDURE — 1159F PR MEDICATION LIST DOCUMENTED IN MEDICAL RECORD: ICD-10-PCS | Mod: CPTII,S$GLB,, | Performed by: INTERNAL MEDICINE

## 2022-07-06 PROCEDURE — 1101F PR PT FALLS ASSESS DOC 0-1 FALLS W/OUT INJ PAST YR: ICD-10-PCS | Mod: CPTII,S$GLB,, | Performed by: INTERNAL MEDICINE

## 2022-07-06 PROCEDURE — 99999 PR PBB SHADOW E&M-EST. PATIENT-LVL III: ICD-10-PCS | Mod: PBBFAC,,, | Performed by: INTERNAL MEDICINE

## 2022-07-06 PROCEDURE — 84439 ASSAY OF FREE THYROXINE: CPT | Performed by: INTERNAL MEDICINE

## 2022-07-06 PROCEDURE — 84443 ASSAY THYROID STIM HORMONE: CPT | Performed by: INTERNAL MEDICINE

## 2022-07-06 PROCEDURE — 80053 COMPREHEN METABOLIC PANEL: CPT | Performed by: INTERNAL MEDICINE

## 2022-07-06 PROCEDURE — 80061 LIPID PANEL: CPT | Performed by: INTERNAL MEDICINE

## 2022-07-06 PROCEDURE — 3078F DIAST BP <80 MM HG: CPT | Mod: CPTII,S$GLB,, | Performed by: INTERNAL MEDICINE

## 2022-07-06 PROCEDURE — 3288F PR FALLS RISK ASSESSMENT DOCUMENTED: ICD-10-PCS | Mod: CPTII,S$GLB,, | Performed by: INTERNAL MEDICINE

## 2022-07-06 PROCEDURE — 1101F PT FALLS ASSESS-DOCD LE1/YR: CPT | Mod: CPTII,S$GLB,, | Performed by: INTERNAL MEDICINE

## 2022-07-06 PROCEDURE — 3078F PR MOST RECENT DIASTOLIC BLOOD PRESSURE < 80 MM HG: ICD-10-PCS | Mod: CPTII,S$GLB,, | Performed by: INTERNAL MEDICINE

## 2022-07-06 PROCEDURE — 3288F FALL RISK ASSESSMENT DOCD: CPT | Mod: CPTII,S$GLB,, | Performed by: INTERNAL MEDICINE

## 2022-07-06 PROCEDURE — 1126F PR PAIN SEVERITY QUANTIFIED, NO PAIN PRESENT: ICD-10-PCS | Mod: CPTII,S$GLB,, | Performed by: INTERNAL MEDICINE

## 2022-07-06 PROCEDURE — 1159F MED LIST DOCD IN RCRD: CPT | Mod: CPTII,S$GLB,, | Performed by: INTERNAL MEDICINE

## 2022-07-06 PROCEDURE — 99214 OFFICE O/P EST MOD 30 MIN: CPT | Mod: S$GLB,,, | Performed by: INTERNAL MEDICINE

## 2022-07-06 PROCEDURE — 99999 PR PBB SHADOW E&M-EST. PATIENT-LVL III: CPT | Mod: PBBFAC,,, | Performed by: INTERNAL MEDICINE

## 2022-07-06 PROCEDURE — 3075F SYST BP GE 130 - 139MM HG: CPT | Mod: CPTII,S$GLB,, | Performed by: INTERNAL MEDICINE

## 2022-07-06 PROCEDURE — 85025 COMPLETE CBC W/AUTO DIFF WBC: CPT | Performed by: INTERNAL MEDICINE

## 2022-07-06 PROCEDURE — 3075F PR MOST RECENT SYSTOLIC BLOOD PRESS GE 130-139MM HG: ICD-10-PCS | Mod: CPTII,S$GLB,, | Performed by: INTERNAL MEDICINE

## 2022-07-06 PROCEDURE — 84153 ASSAY OF PSA TOTAL: CPT | Performed by: INTERNAL MEDICINE

## 2022-07-06 PROCEDURE — 99214 PR OFFICE/OUTPT VISIT, EST, LEVL IV, 30-39 MIN: ICD-10-PCS | Mod: S$GLB,,, | Performed by: INTERNAL MEDICINE

## 2022-07-06 PROCEDURE — 1126F AMNT PAIN NOTED NONE PRSNT: CPT | Mod: CPTII,S$GLB,, | Performed by: INTERNAL MEDICINE

## 2022-07-06 PROCEDURE — 36415 COLL VENOUS BLD VENIPUNCTURE: CPT | Mod: PO | Performed by: INTERNAL MEDICINE

## 2022-07-06 NOTE — PROGRESS NOTES
Subjective:       Patient ID: Jose Manuel Ford Jr. is a 81 y.o. male.    Chief Complaint: Follow-up (6m), Hypertension, Hyperlipidemia, and Hypothyroidism    Follow-up  Associated symptoms include arthralgias. Pertinent negatives include no abdominal pain, chest pain, chills, coughing, diaphoresis, fatigue, fever, headaches, joint swelling, myalgias, nausea, neck pain, numbness, rash, sore throat, vomiting or weakness.   Hypertension  Pertinent negatives include no chest pain, headaches, neck pain, palpitations or shortness of breath.   Hyperlipidemia  Pertinent negatives include no chest pain, myalgias or shortness of breath.     Past Medical History:   Diagnosis Date    Depression     Elevated alkaline phosphatase level 5/27/2016    HTN (hypertension)     Hypercholesteremia     Hypothyroidism     Obesity     Renal manifestation of secondary diabetes mellitus      No past surgical history on file.  Family History   Problem Relation Age of Onset    Stroke Mother     COPD Father     Cancer Neg Hx     Hearing loss Neg Hx     Kidney disease Neg Hx      Social History     Socioeconomic History    Marital status:    Tobacco Use    Smoking status: Former Smoker    Smokeless tobacco: Former User     Quit date: 1/1/1968   Substance and Sexual Activity    Alcohol use: No     Alcohol/week: 0.0 standard drinks    Drug use: No    Sexual activity: Yes     Review of Systems   Constitutional: Negative for activity change, appetite change, chills, diaphoresis, fatigue, fever and unexpected weight change.   HENT: Negative for drooling, ear discharge, ear pain, facial swelling, hearing loss, mouth sores, nosebleeds, postnasal drip, rhinorrhea, sinus pressure, sneezing, sore throat, tinnitus, trouble swallowing and voice change.    Eyes: Negative for photophobia, redness and visual disturbance.   Respiratory: Negative for apnea, cough, choking, chest tightness, shortness of breath and wheezing.     Cardiovascular: Negative for chest pain, palpitations and leg swelling.   Gastrointestinal: Negative for abdominal distention, abdominal pain, anal bleeding, blood in stool, constipation, diarrhea, nausea, rectal pain and vomiting.   Endocrine: Negative for cold intolerance, heat intolerance, polydipsia, polyphagia and polyuria.   Genitourinary: Negative for difficulty urinating, dysuria, enuresis, flank pain, frequency, genital sores, hematuria and urgency.   Musculoskeletal: Positive for arthralgias. Negative for back pain, gait problem, joint swelling, myalgias, neck pain and neck stiffness.   Skin: Negative for color change, pallor, rash and wound.   Allergic/Immunologic: Negative for food allergies and immunocompromised state.   Neurological: Negative for dizziness, tremors, seizures, syncope, facial asymmetry, speech difficulty, weakness, light-headedness, numbness and headaches.   Hematological: Negative for adenopathy. Does not bruise/bleed easily.   Psychiatric/Behavioral: Negative for agitation, behavioral problems, confusion, decreased concentration, dysphoric mood, hallucinations, self-injury, sleep disturbance and suicidal ideas. The patient is not nervous/anxious and is not hyperactive.        Objective:      Physical Exam  Vitals and nursing note reviewed.   Constitutional:       General: He is not in acute distress.     Appearance: Normal appearance. He is well-developed. He is not diaphoretic.   HENT:      Head: Normocephalic and atraumatic.      Mouth/Throat:      Pharynx: No oropharyngeal exudate.   Eyes:      General: No scleral icterus.     Pupils: Pupils are equal, round, and reactive to light.   Neck:      Thyroid: No thyromegaly.      Vascular: No carotid bruit or JVD.      Trachea: No tracheal deviation.   Cardiovascular:      Rate and Rhythm: Normal rate and regular rhythm.      Heart sounds: Normal heart sounds.   Pulmonary:      Effort: Pulmonary effort is normal. No respiratory  distress.      Breath sounds: Normal breath sounds. No wheezing or rales.   Chest:      Chest wall: No tenderness.   Abdominal:      General: Bowel sounds are normal. There is no distension.      Palpations: Abdomen is soft.      Tenderness: There is no abdominal tenderness. There is no guarding or rebound.   Musculoskeletal:         General: No tenderness. Normal range of motion.      Cervical back: Normal range of motion and neck supple.   Lymphadenopathy:      Cervical: No cervical adenopathy.   Skin:     General: Skin is warm and dry.      Coloration: Skin is not pale.      Findings: No erythema or rash.   Neurological:      Mental Status: He is alert and oriented to person, place, and time.   Psychiatric:         Behavior: Behavior normal.         Thought Content: Thought content normal.         Judgment: Judgment normal.         CMP  Sodium   Date Value Ref Range Status   06/22/2021 139 136 - 145 mmol/L Final     Potassium   Date Value Ref Range Status   06/22/2021 4.0 3.5 - 5.1 mmol/L Final     Chloride   Date Value Ref Range Status   06/22/2021 108 95 - 110 mmol/L Final     CO2   Date Value Ref Range Status   06/22/2021 21 (L) 23 - 29 mmol/L Final     Glucose   Date Value Ref Range Status   06/22/2021 103 70 - 110 mg/dL Final     BUN   Date Value Ref Range Status   06/22/2021 15 8 - 23 mg/dL Final     Creatinine   Date Value Ref Range Status   03/03/2022 1.4 0.5 - 1.4 mg/dL Final     Calcium   Date Value Ref Range Status   06/22/2021 9.7 8.7 - 10.5 mg/dL Final     Total Protein   Date Value Ref Range Status   06/22/2021 7.7 6.0 - 8.4 g/dL Final     Albumin   Date Value Ref Range Status   06/22/2021 4.3 3.5 - 5.2 g/dL Final   09/06/2016 4.2 3.6 - 5.1 g/dL Final     Comment:     @ Test Performed By:  [employer] [facility] TONO Rm M.D..,   [address] [place] [address]  [facility]  [id number]       Total Bilirubin   Date Value Ref Range Status   06/22/2021  0.6 0.1 - 1.0 mg/dL Final     Comment:     For infants and newborns, interpretation of results should be based  on gestational age, weight and in agreement with clinical  observations.    Premature Infant recommended reference ranges:  Up to 24 hours.............<8.0 mg/dL  Up to 48 hours............<12.0 mg/dL  3-5 days..................<15.0 mg/dL  6-29 days.................<15.0 mg/dL       Alkaline Phosphatase   Date Value Ref Range Status   06/22/2021 127 55 - 135 U/L Final     AST   Date Value Ref Range Status   06/22/2021 18 10 - 40 U/L Final     ALT   Date Value Ref Range Status   06/22/2021 19 10 - 44 U/L Final     Anion Gap   Date Value Ref Range Status   06/22/2021 10 8 - 16 mmol/L Final     eGFR if    Date Value Ref Range Status   03/03/2022 54 (A) >60 mL/min/1.73 m^2 Final     eGFR if non    Date Value Ref Range Status   03/03/2022 47 (A) >60 mL/min/1.73 m^2 Final     Comment:     Calculation used to obtain the estimated glomerular filtration  rate (eGFR) is the CKD-EPI equation.        Lab Results   Component Value Date    WBC 4.94 06/22/2021    HGB 14.6 06/22/2021    HCT 42.9 06/22/2021    MCV 92 06/22/2021     06/22/2021     Lab Results   Component Value Date    CHOL 269 (H) 06/22/2021     Lab Results   Component Value Date    HDL 48 06/22/2021     Lab Results   Component Value Date    LDLCALC 186.4 (H) 06/22/2021     Lab Results   Component Value Date    TRIG 173 (H) 06/22/2021     Lab Results   Component Value Date    CHOLHDL 17.8 (L) 06/22/2021     Lab Results   Component Value Date    TSH 6.645 (H) 06/22/2021     Lab Results   Component Value Date    HGBA1C 5.7 08/21/2014     Assessment:       1. Hypothyroidism due to acquired atrophy of thyroid    2. Hypercholesteremia    3. Primary hypertension    4. Prostate cancer        Plan:   Hypothyroidism due to acquired atrophy of thyroid----refuses med------  -     TSH; Future; Expected date:  07/06/2022    Hypercholesteremia-----refuses med--------  -     Lipid Panel; Future; Expected date: 07/06/2022    Primary hypertension------------doing ok------------  -     Comprehensive Metabolic Panel; Future; Expected date: 07/06/2022  -     CBC Auto Differential; Future; Expected date: 07/06/2022    Prostate cancer-----------------sees urology dr ramsay------------  -     Prostate Specific Antigen, Diagnostic; Future; Expected date: 07/06/2022    Stable---------------as above------------------f/u 6 months------

## 2022-07-07 ENCOUNTER — PATIENT MESSAGE (OUTPATIENT)
Dept: FAMILY MEDICINE | Facility: CLINIC | Age: 82
End: 2022-07-07
Payer: MEDICARE

## 2022-07-07 ENCOUNTER — TELEPHONE (OUTPATIENT)
Dept: FAMILY MEDICINE | Facility: CLINIC | Age: 82
End: 2022-07-07
Payer: MEDICARE

## 2022-07-07 DIAGNOSIS — D64.9 ANEMIA, UNSPECIFIED TYPE: Primary | ICD-10-CM

## 2022-07-07 NOTE — TELEPHONE ENCOUNTER
Repeat cbc,ferritin,iron in 3 weeks----------?anemia----------------cholesterol very high--recommend statin med.           psa is better-

## 2022-08-16 ENCOUNTER — TELEPHONE (OUTPATIENT)
Dept: ADMINISTRATIVE | Facility: HOSPITAL | Age: 82
End: 2022-08-16
Payer: MEDICARE

## 2023-01-06 ENCOUNTER — LAB VISIT (OUTPATIENT)
Dept: LAB | Facility: HOSPITAL | Age: 83
End: 2023-01-06
Attending: INTERNAL MEDICINE
Payer: MEDICARE

## 2023-01-06 ENCOUNTER — OFFICE VISIT (OUTPATIENT)
Dept: FAMILY MEDICINE | Facility: CLINIC | Age: 83
End: 2023-01-06
Payer: MEDICARE

## 2023-01-06 VITALS
DIASTOLIC BLOOD PRESSURE: 88 MMHG | TEMPERATURE: 98 F | BODY MASS INDEX: 31.97 KG/M2 | HEART RATE: 75 BPM | SYSTOLIC BLOOD PRESSURE: 128 MMHG | RESPIRATION RATE: 16 BRPM | WEIGHT: 216.5 LBS | OXYGEN SATURATION: 98 %

## 2023-01-06 DIAGNOSIS — E78.00 HYPERCHOLESTEREMIA: ICD-10-CM

## 2023-01-06 DIAGNOSIS — K21.9 GASTROESOPHAGEAL REFLUX DISEASE: ICD-10-CM

## 2023-01-06 DIAGNOSIS — C61 PROSTATE CANCER: ICD-10-CM

## 2023-01-06 DIAGNOSIS — D64.9 ANEMIA, UNSPECIFIED TYPE: ICD-10-CM

## 2023-01-06 DIAGNOSIS — I10 PRIMARY HYPERTENSION: ICD-10-CM

## 2023-01-06 DIAGNOSIS — E03.4 HYPOTHYROIDISM DUE TO ACQUIRED ATROPHY OF THYROID: ICD-10-CM

## 2023-01-06 DIAGNOSIS — I10 PRIMARY HYPERTENSION: Primary | ICD-10-CM

## 2023-01-06 PROCEDURE — 1126F AMNT PAIN NOTED NONE PRSNT: CPT | Mod: HCNC,CPTII,S$GLB, | Performed by: INTERNAL MEDICINE

## 2023-01-06 PROCEDURE — 99214 OFFICE O/P EST MOD 30 MIN: CPT | Mod: HCNC,S$GLB,, | Performed by: INTERNAL MEDICINE

## 2023-01-06 PROCEDURE — 84403 ASSAY OF TOTAL TESTOSTERONE: CPT | Mod: HCNC | Performed by: INTERNAL MEDICINE

## 2023-01-06 PROCEDURE — 3074F PR MOST RECENT SYSTOLIC BLOOD PRESSURE < 130 MM HG: ICD-10-PCS | Mod: HCNC,CPTII,S$GLB, | Performed by: INTERNAL MEDICINE

## 2023-01-06 PROCEDURE — 83540 ASSAY OF IRON: CPT | Mod: HCNC | Performed by: INTERNAL MEDICINE

## 2023-01-06 PROCEDURE — 1126F PR PAIN SEVERITY QUANTIFIED, NO PAIN PRESENT: ICD-10-PCS | Mod: HCNC,CPTII,S$GLB, | Performed by: INTERNAL MEDICINE

## 2023-01-06 PROCEDURE — 85025 COMPLETE CBC W/AUTO DIFF WBC: CPT | Mod: HCNC | Performed by: INTERNAL MEDICINE

## 2023-01-06 PROCEDURE — 1101F PR PT FALLS ASSESS DOC 0-1 FALLS W/OUT INJ PAST YR: ICD-10-PCS | Mod: HCNC,CPTII,S$GLB, | Performed by: INTERNAL MEDICINE

## 2023-01-06 PROCEDURE — 3079F DIAST BP 80-89 MM HG: CPT | Mod: HCNC,CPTII,S$GLB, | Performed by: INTERNAL MEDICINE

## 2023-01-06 PROCEDURE — 99214 PR OFFICE/OUTPT VISIT, EST, LEVL IV, 30-39 MIN: ICD-10-PCS | Mod: HCNC,S$GLB,, | Performed by: INTERNAL MEDICINE

## 2023-01-06 PROCEDURE — 80053 COMPREHEN METABOLIC PANEL: CPT | Mod: HCNC | Performed by: INTERNAL MEDICINE

## 2023-01-06 PROCEDURE — 3079F PR MOST RECENT DIASTOLIC BLOOD PRESSURE 80-89 MM HG: ICD-10-PCS | Mod: HCNC,CPTII,S$GLB, | Performed by: INTERNAL MEDICINE

## 2023-01-06 PROCEDURE — 3288F PR FALLS RISK ASSESSMENT DOCUMENTED: ICD-10-PCS | Mod: HCNC,CPTII,S$GLB, | Performed by: INTERNAL MEDICINE

## 2023-01-06 PROCEDURE — 82728 ASSAY OF FERRITIN: CPT | Mod: HCNC | Performed by: INTERNAL MEDICINE

## 2023-01-06 PROCEDURE — 1159F MED LIST DOCD IN RCRD: CPT | Mod: HCNC,CPTII,S$GLB, | Performed by: INTERNAL MEDICINE

## 2023-01-06 PROCEDURE — 99999 PR PBB SHADOW E&M-EST. PATIENT-LVL III: CPT | Mod: PBBFAC,HCNC,, | Performed by: INTERNAL MEDICINE

## 2023-01-06 PROCEDURE — 3074F SYST BP LT 130 MM HG: CPT | Mod: HCNC,CPTII,S$GLB, | Performed by: INTERNAL MEDICINE

## 2023-01-06 PROCEDURE — 99999 PR PBB SHADOW E&M-EST. PATIENT-LVL III: ICD-10-PCS | Mod: PBBFAC,HCNC,, | Performed by: INTERNAL MEDICINE

## 2023-01-06 PROCEDURE — 3288F FALL RISK ASSESSMENT DOCD: CPT | Mod: HCNC,CPTII,S$GLB, | Performed by: INTERNAL MEDICINE

## 2023-01-06 PROCEDURE — 84153 ASSAY OF PSA TOTAL: CPT | Mod: HCNC | Performed by: INTERNAL MEDICINE

## 2023-01-06 PROCEDURE — 36415 COLL VENOUS BLD VENIPUNCTURE: CPT | Mod: HCNC,PO | Performed by: INTERNAL MEDICINE

## 2023-01-06 PROCEDURE — 1101F PT FALLS ASSESS-DOCD LE1/YR: CPT | Mod: HCNC,CPTII,S$GLB, | Performed by: INTERNAL MEDICINE

## 2023-01-06 PROCEDURE — 1159F PR MEDICATION LIST DOCUMENTED IN MEDICAL RECORD: ICD-10-PCS | Mod: HCNC,CPTII,S$GLB, | Performed by: INTERNAL MEDICINE

## 2023-01-06 RX ORDER — OMEPRAZOLE 20 MG/1
20 CAPSULE, DELAYED RELEASE ORAL DAILY
Qty: 90 CAPSULE | Refills: 3 | Status: SHIPPED | OUTPATIENT
Start: 2023-01-06 | End: 2024-03-25

## 2023-01-06 NOTE — PROGRESS NOTES
Subjective:       Patient ID: Jose Manuel Ford Jr. is a 82 y.o. male.    Chief Complaint: Follow-up (6m), Hypertension, Hyperlipidemia, and Hypothyroidism    Follow-up  Associated symptoms include arthralgias. Pertinent negatives include no abdominal pain, chest pain, chills, coughing, diaphoresis, fatigue, fever, headaches, joint swelling, myalgias, nausea, neck pain, numbness, rash, sore throat, vomiting or weakness.   Hypertension  Pertinent negatives include no chest pain, headaches, neck pain, palpitations or shortness of breath.   Hyperlipidemia  Pertinent negatives include no chest pain, myalgias or shortness of breath.   Past Medical History:   Diagnosis Date    Depression     Elevated alkaline phosphatase level 5/27/2016    HTN (hypertension)     Hypercholesteremia     Hypothyroidism     Obesity     Renal manifestation of secondary diabetes mellitus      No past surgical history on file.  Family History   Problem Relation Age of Onset    Stroke Mother     COPD Father     Cancer Neg Hx     Hearing loss Neg Hx     Kidney disease Neg Hx      Social History     Socioeconomic History    Marital status:    Tobacco Use    Smoking status: Former    Smokeless tobacco: Former     Quit date: 1/1/1968   Substance and Sexual Activity    Alcohol use: No     Alcohol/week: 0.0 standard drinks    Drug use: No    Sexual activity: Yes     Review of Systems   Constitutional:  Negative for activity change, appetite change, chills, diaphoresis, fatigue, fever and unexpected weight change.   HENT:  Negative for drooling, ear discharge, ear pain, facial swelling, hearing loss, mouth sores, nosebleeds, postnasal drip, rhinorrhea, sinus pressure, sneezing, sore throat, tinnitus, trouble swallowing and voice change.    Eyes:  Negative for photophobia, redness and visual disturbance.   Respiratory:  Negative for apnea, cough, choking, chest tightness, shortness of breath and wheezing.    Cardiovascular:  Negative for chest  pain, palpitations and leg swelling.   Gastrointestinal:  Negative for abdominal distention, abdominal pain, anal bleeding, blood in stool, constipation, diarrhea, nausea, rectal pain and vomiting.   Endocrine: Negative for cold intolerance, heat intolerance, polydipsia, polyphagia and polyuria.   Genitourinary:  Negative for difficulty urinating, dysuria, enuresis, flank pain, frequency, genital sores, hematuria and urgency.   Musculoskeletal:  Positive for arthralgias. Negative for back pain, gait problem, joint swelling, myalgias, neck pain and neck stiffness.   Skin:  Negative for color change, pallor, rash and wound.   Allergic/Immunologic: Negative for food allergies and immunocompromised state.   Neurological:  Negative for dizziness, tremors, seizures, syncope, facial asymmetry, speech difficulty, weakness, light-headedness, numbness and headaches.   Hematological:  Negative for adenopathy. Does not bruise/bleed easily.   Psychiatric/Behavioral:  Negative for agitation, behavioral problems, confusion, decreased concentration, dysphoric mood, hallucinations, self-injury, sleep disturbance and suicidal ideas. The patient is not nervous/anxious and is not hyperactive.      Objective:      Physical Exam  Vitals and nursing note reviewed.   Constitutional:       General: He is not in acute distress.     Appearance: Normal appearance. He is well-developed. He is not diaphoretic.   HENT:      Head: Normocephalic and atraumatic.      Mouth/Throat:      Pharynx: No oropharyngeal exudate.   Eyes:      General: No scleral icterus.     Pupils: Pupils are equal, round, and reactive to light.   Neck:      Thyroid: No thyromegaly.      Vascular: No carotid bruit or JVD.      Trachea: No tracheal deviation.   Cardiovascular:      Rate and Rhythm: Normal rate and regular rhythm.      Heart sounds: Normal heart sounds.   Pulmonary:      Effort: Pulmonary effort is normal. No respiratory distress.      Breath sounds: Normal  breath sounds. No wheezing or rales.   Chest:      Chest wall: No tenderness.   Abdominal:      General: Bowel sounds are normal. There is no distension.      Palpations: Abdomen is soft.      Tenderness: There is no abdominal tenderness. There is no guarding or rebound.   Musculoskeletal:         General: No tenderness. Normal range of motion.      Cervical back: Normal range of motion and neck supple.   Lymphadenopathy:      Cervical: No cervical adenopathy.   Skin:     General: Skin is warm and dry.      Coloration: Skin is not pale.      Findings: No erythema or rash.   Neurological:      Mental Status: He is alert and oriented to person, place, and time.   Psychiatric:         Behavior: Behavior normal.         Thought Content: Thought content normal.         Judgment: Judgment normal.       CMP  Sodium   Date Value Ref Range Status   07/06/2022 139 136 - 145 mmol/L Final     Potassium   Date Value Ref Range Status   07/06/2022 4.8 3.5 - 5.1 mmol/L Final     Chloride   Date Value Ref Range Status   07/06/2022 105 95 - 110 mmol/L Final     CO2   Date Value Ref Range Status   07/06/2022 22 (L) 23 - 29 mmol/L Final     Glucose   Date Value Ref Range Status   07/06/2022 105 70 - 110 mg/dL Final     BUN   Date Value Ref Range Status   07/06/2022 24 (H) 8 - 23 mg/dL Final     Creatinine   Date Value Ref Range Status   07/06/2022 1.3 0.5 - 1.4 mg/dL Final     Calcium   Date Value Ref Range Status   07/06/2022 9.9 8.7 - 10.5 mg/dL Final     Total Protein   Date Value Ref Range Status   07/06/2022 8.0 6.0 - 8.4 g/dL Final     Albumin   Date Value Ref Range Status   07/06/2022 4.4 3.5 - 5.2 g/dL Final   09/06/2016 4.2 3.6 - 5.1 g/dL Final     Comment:     @ Test Performed By:  [employer] MckeonTONO Shay M.D..,   [address] [place] [address]  [facility]  [id number]       Total Bilirubin   Date Value Ref Range Status   07/06/2022 0.6 0.1 - 1.0 mg/dL Final      Comment:     For infants and newborns, interpretation of results should be based  on gestational age, weight and in agreement with clinical  observations.    Premature Infant recommended reference ranges:  Up to 24 hours.............<8.0 mg/dL  Up to 48 hours............<12.0 mg/dL  3-5 days..................<15.0 mg/dL  6-29 days.................<15.0 mg/dL       Alkaline Phosphatase   Date Value Ref Range Status   07/06/2022 135 55 - 135 U/L Final     AST   Date Value Ref Range Status   07/06/2022 25 10 - 40 U/L Final     ALT   Date Value Ref Range Status   07/06/2022 19 10 - 44 U/L Final     Anion Gap   Date Value Ref Range Status   07/06/2022 12 8 - 16 mmol/L Final     eGFR if    Date Value Ref Range Status   07/06/2022 59.6 (A) >60 mL/min/1.73 m^2 Final     eGFR if non    Date Value Ref Range Status   07/06/2022 51.5 (A) >60 mL/min/1.73 m^2 Final     Comment:     Calculation used to obtain the estimated glomerular filtration  rate (eGFR) is the CKD-EPI equation.        Lab Results   Component Value Date    WBC 5.13 07/06/2022    HGB 13.9 (L) 07/06/2022    HCT 42.1 07/06/2022    MCV 95 07/06/2022     07/06/2022     Lab Results   Component Value Date    CHOL 319 (H) 07/06/2022     Lab Results   Component Value Date    HDL 61 07/06/2022     Lab Results   Component Value Date    LDLCALC 215.0 (H) 07/06/2022     Lab Results   Component Value Date    TRIG 215 (H) 07/06/2022     Lab Results   Component Value Date    CHOLHDL 19.1 (L) 07/06/2022     Lab Results   Component Value Date    TSH 4.852 (H) 07/06/2022     Lab Results   Component Value Date    HGBA1C 5.7 08/21/2014     Assessment:       1. Primary hypertension    2. Hypercholesteremia    3. Hypothyroidism due to acquired atrophy of thyroid    4. Prostate cancer    5. Anemia, unspecified type          Plan:   Primary hypertension------doing ok---  -     Comprehensive Metabolic Panel; Future; Expected date: 01/06/2023  -      CBC Auto Differential; Future; Expected date: 01/06/2023    Hypercholesteremia----------refuses chol med-------    Hypothyroidism due to acquired atrophy of thyroid----------------declines thyroid med-------    Prostate cancer----------sees urology dr ramsay-------  -     Testosterone; Future; Expected date: 01/06/2023  -     Prostate Specific Antigen, Diagnostic; Future; Expected date: 01/06/2023    Anemia, unspecified type  -     Ferritin; Future; Expected date: 01/06/2023  -     Iron; Future; Expected date: 01/06/2023      Watch diet, exercise. As above------------f/u 1 year-------

## 2023-01-06 NOTE — TELEPHONE ENCOUNTER
No new care gaps identified.  [facility] Embedded Care Gaps. Reference number: [id number]. 1/06/2023   10:20:10 AM CST

## 2023-01-07 LAB
ALBUMIN SERPL BCP-MCNC: 4.3 G/DL (ref 3.5–5.2)
ALP SERPL-CCNC: 137 U/L (ref 55–135)
ALT SERPL W/O P-5'-P-CCNC: 17 U/L (ref 10–44)
ANION GAP SERPL CALC-SCNC: 8 MMOL/L (ref 8–16)
AST SERPL-CCNC: 19 U/L (ref 10–40)
BASOPHILS # BLD AUTO: 0.01 K/UL (ref 0–0.2)
BASOPHILS NFR BLD: 0.2 % (ref 0–1.9)
BILIRUB SERPL-MCNC: 0.6 MG/DL (ref 0.1–1)
BUN SERPL-MCNC: 22 MG/DL (ref 8–23)
CALCIUM SERPL-MCNC: 10.5 MG/DL (ref 8.7–10.5)
CHLORIDE SERPL-SCNC: 105 MMOL/L (ref 95–110)
CO2 SERPL-SCNC: 27 MMOL/L (ref 23–29)
COMPLEXED PSA SERPL-MCNC: <0.01 NG/ML (ref 0–4)
CREAT SERPL-MCNC: 1.2 MG/DL (ref 0.5–1.4)
DIFFERENTIAL METHOD: ABNORMAL
EOSINOPHIL # BLD AUTO: 0.2 K/UL (ref 0–0.5)
EOSINOPHIL NFR BLD: 3.5 % (ref 0–8)
ERYTHROCYTE [DISTWIDTH] IN BLOOD BY AUTOMATED COUNT: 13.3 % (ref 11.5–14.5)
EST. GFR  (NO RACE VARIABLE): >60 ML/MIN/1.73 M^2
FERRITIN SERPL-MCNC: 417 NG/ML (ref 20–300)
GLUCOSE SERPL-MCNC: 105 MG/DL (ref 70–110)
HCT VFR BLD AUTO: 40.1 % (ref 40–54)
HGB BLD-MCNC: 13.2 G/DL (ref 14–18)
IMM GRANULOCYTES # BLD AUTO: 0.01 K/UL (ref 0–0.04)
IMM GRANULOCYTES NFR BLD AUTO: 0.2 % (ref 0–0.5)
IRON SERPL-MCNC: 106 UG/DL (ref 45–160)
LYMPHOCYTES # BLD AUTO: 1.1 K/UL (ref 1–4.8)
LYMPHOCYTES NFR BLD: 25.1 % (ref 18–48)
MCH RBC QN AUTO: 31.2 PG (ref 27–31)
MCHC RBC AUTO-ENTMCNC: 32.9 G/DL (ref 32–36)
MCV RBC AUTO: 95 FL (ref 82–98)
MONOCYTES # BLD AUTO: 0.6 K/UL (ref 0.3–1)
MONOCYTES NFR BLD: 12.9 % (ref 4–15)
NEUTROPHILS # BLD AUTO: 2.5 K/UL (ref 1.8–7.7)
NEUTROPHILS NFR BLD: 58.1 % (ref 38–73)
NRBC BLD-RTO: 0 /100 WBC
PLATELET # BLD AUTO: 210 K/UL (ref 150–450)
PMV BLD AUTO: 11.6 FL (ref 9.2–12.9)
POTASSIUM SERPL-SCNC: 5.2 MMOL/L (ref 3.5–5.1)
PROT SERPL-MCNC: 7.6 G/DL (ref 6–8.4)
RBC # BLD AUTO: 4.23 M/UL (ref 4.6–6.2)
SODIUM SERPL-SCNC: 140 MMOL/L (ref 136–145)
TESTOST SERPL-MCNC: 41 NG/DL (ref 304–1227)
WBC # BLD AUTO: 4.26 K/UL (ref 3.9–12.7)

## 2023-01-08 ENCOUNTER — TELEPHONE (OUTPATIENT)
Dept: FAMILY MEDICINE | Facility: CLINIC | Age: 83
End: 2023-01-08
Payer: MEDICARE

## 2023-01-08 DIAGNOSIS — D64.9 ANEMIA, UNSPECIFIED TYPE: Primary | ICD-10-CM

## 2023-01-09 ENCOUNTER — PATIENT MESSAGE (OUTPATIENT)
Dept: FAMILY MEDICINE | Facility: CLINIC | Age: 83
End: 2023-01-09
Payer: MEDICARE

## 2023-02-09 DIAGNOSIS — Z00.00 ENCOUNTER FOR MEDICARE ANNUAL WELLNESS EXAM: ICD-10-CM

## 2024-01-08 ENCOUNTER — OFFICE VISIT (OUTPATIENT)
Dept: FAMILY MEDICINE | Facility: CLINIC | Age: 84
End: 2024-01-08
Payer: MEDICARE

## 2024-01-08 ENCOUNTER — LAB VISIT (OUTPATIENT)
Dept: LAB | Facility: HOSPITAL | Age: 84
End: 2024-01-08
Attending: INTERNAL MEDICINE
Payer: MEDICARE

## 2024-01-08 VITALS
WEIGHT: 206.81 LBS | TEMPERATURE: 97 F | HEIGHT: 69 IN | BODY MASS INDEX: 30.63 KG/M2 | HEART RATE: 79 BPM | SYSTOLIC BLOOD PRESSURE: 126 MMHG | RESPIRATION RATE: 18 BRPM | DIASTOLIC BLOOD PRESSURE: 78 MMHG | OXYGEN SATURATION: 95 %

## 2024-01-08 DIAGNOSIS — E78.00 HYPERCHOLESTEREMIA: Primary | ICD-10-CM

## 2024-01-08 DIAGNOSIS — D64.9 ANEMIA, UNSPECIFIED TYPE: ICD-10-CM

## 2024-01-08 DIAGNOSIS — E78.00 HYPERCHOLESTEREMIA: ICD-10-CM

## 2024-01-08 DIAGNOSIS — C61 PROSTATE CANCER: ICD-10-CM

## 2024-01-08 DIAGNOSIS — K76.0 FATTY LIVER DISEASE, NONALCOHOLIC: ICD-10-CM

## 2024-01-08 DIAGNOSIS — E03.4 HYPOTHYROIDISM DUE TO ACQUIRED ATROPHY OF THYROID: ICD-10-CM

## 2024-01-08 LAB
ALBUMIN SERPL BCP-MCNC: 4.2 G/DL (ref 3.5–5.2)
ALP SERPL-CCNC: 170 U/L (ref 55–135)
ALT SERPL W/O P-5'-P-CCNC: 16 U/L (ref 10–44)
ANION GAP SERPL CALC-SCNC: 10 MMOL/L (ref 8–16)
AST SERPL-CCNC: 15 U/L (ref 10–40)
BASOPHILS # BLD AUTO: 0.01 K/UL (ref 0–0.2)
BASOPHILS NFR BLD: 0.2 % (ref 0–1.9)
BILIRUB SERPL-MCNC: 0.6 MG/DL (ref 0.1–1)
BUN SERPL-MCNC: 19 MG/DL (ref 8–23)
CALCIUM SERPL-MCNC: 9.9 MG/DL (ref 8.7–10.5)
CHLORIDE SERPL-SCNC: 107 MMOL/L (ref 95–110)
CHOLEST SERPL-MCNC: 271 MG/DL (ref 120–199)
CHOLEST/HDLC SERPL: 4.9 {RATIO} (ref 2–5)
CO2 SERPL-SCNC: 23 MMOL/L (ref 23–29)
CREAT SERPL-MCNC: 0.9 MG/DL (ref 0.5–1.4)
DIFFERENTIAL METHOD BLD: ABNORMAL
EOSINOPHIL # BLD AUTO: 0.2 K/UL (ref 0–0.5)
EOSINOPHIL NFR BLD: 3.6 % (ref 0–8)
ERYTHROCYTE [DISTWIDTH] IN BLOOD BY AUTOMATED COUNT: 14.2 % (ref 11.5–14.5)
EST. GFR  (NO RACE VARIABLE): >60 ML/MIN/1.73 M^2
FERRITIN SERPL-MCNC: 428 NG/ML (ref 20–300)
GLUCOSE SERPL-MCNC: 99 MG/DL (ref 70–110)
HCT VFR BLD AUTO: 41.6 % (ref 40–54)
HDLC SERPL-MCNC: 55 MG/DL (ref 40–75)
HDLC SERPL: 20.3 % (ref 20–50)
HGB BLD-MCNC: 14 G/DL (ref 14–18)
IMM GRANULOCYTES # BLD AUTO: 0.01 K/UL (ref 0–0.04)
IMM GRANULOCYTES NFR BLD AUTO: 0.2 % (ref 0–0.5)
IRON SERPL-MCNC: 73 UG/DL (ref 45–160)
LDLC SERPL CALC-MCNC: 194 MG/DL (ref 63–159)
LYMPHOCYTES # BLD AUTO: 0.8 K/UL (ref 1–4.8)
LYMPHOCYTES NFR BLD: 20.1 % (ref 18–48)
MCH RBC QN AUTO: 31.4 PG (ref 27–31)
MCHC RBC AUTO-ENTMCNC: 33.7 G/DL (ref 32–36)
MCV RBC AUTO: 93 FL (ref 82–98)
MONOCYTES # BLD AUTO: 0.5 K/UL (ref 0.3–1)
MONOCYTES NFR BLD: 12.4 % (ref 4–15)
NEUTROPHILS # BLD AUTO: 2.7 K/UL (ref 1.8–7.7)
NEUTROPHILS NFR BLD: 63.5 % (ref 38–73)
NONHDLC SERPL-MCNC: 216 MG/DL
NRBC BLD-RTO: 0 /100 WBC
PLATELET # BLD AUTO: 225 K/UL (ref 150–450)
PMV BLD AUTO: 11.3 FL (ref 9.2–12.9)
POTASSIUM SERPL-SCNC: 4.5 MMOL/L (ref 3.5–5.1)
PROT SERPL-MCNC: 7.8 G/DL (ref 6–8.4)
RBC # BLD AUTO: 4.46 M/UL (ref 4.6–6.2)
SODIUM SERPL-SCNC: 140 MMOL/L (ref 136–145)
TRIGL SERPL-MCNC: 110 MG/DL (ref 30–150)
TSH SERPL DL<=0.005 MIU/L-ACNC: 5.82 UIU/ML (ref 0.4–4)
WBC # BLD AUTO: 4.18 K/UL (ref 3.9–12.7)

## 2024-01-08 PROCEDURE — 84443 ASSAY THYROID STIM HORMONE: CPT | Mod: HCNC | Performed by: INTERNAL MEDICINE

## 2024-01-08 PROCEDURE — 83540 ASSAY OF IRON: CPT | Mod: HCNC | Performed by: INTERNAL MEDICINE

## 2024-01-08 PROCEDURE — 1101F PT FALLS ASSESS-DOCD LE1/YR: CPT | Mod: HCNC,CPTII,S$GLB, | Performed by: INTERNAL MEDICINE

## 2024-01-08 PROCEDURE — 99214 OFFICE O/P EST MOD 30 MIN: CPT | Mod: HCNC,S$GLB,, | Performed by: INTERNAL MEDICINE

## 2024-01-08 PROCEDURE — 3078F DIAST BP <80 MM HG: CPT | Mod: HCNC,CPTII,S$GLB, | Performed by: INTERNAL MEDICINE

## 2024-01-08 PROCEDURE — 1159F MED LIST DOCD IN RCRD: CPT | Mod: HCNC,CPTII,S$GLB, | Performed by: INTERNAL MEDICINE

## 2024-01-08 PROCEDURE — 85025 COMPLETE CBC W/AUTO DIFF WBC: CPT | Mod: HCNC | Performed by: INTERNAL MEDICINE

## 2024-01-08 PROCEDURE — 1126F AMNT PAIN NOTED NONE PRSNT: CPT | Mod: HCNC,CPTII,S$GLB, | Performed by: INTERNAL MEDICINE

## 2024-01-08 PROCEDURE — 3288F FALL RISK ASSESSMENT DOCD: CPT | Mod: HCNC,CPTII,S$GLB, | Performed by: INTERNAL MEDICINE

## 2024-01-08 PROCEDURE — 82728 ASSAY OF FERRITIN: CPT | Mod: HCNC | Performed by: INTERNAL MEDICINE

## 2024-01-08 PROCEDURE — 36415 COLL VENOUS BLD VENIPUNCTURE: CPT | Mod: HCNC,PO | Performed by: INTERNAL MEDICINE

## 2024-01-08 PROCEDURE — 80061 LIPID PANEL: CPT | Mod: HCNC | Performed by: INTERNAL MEDICINE

## 2024-01-08 PROCEDURE — 3074F SYST BP LT 130 MM HG: CPT | Mod: HCNC,CPTII,S$GLB, | Performed by: INTERNAL MEDICINE

## 2024-01-08 PROCEDURE — 84439 ASSAY OF FREE THYROXINE: CPT | Mod: HCNC | Performed by: INTERNAL MEDICINE

## 2024-01-08 PROCEDURE — 99999 PR PBB SHADOW E&M-EST. PATIENT-LVL III: CPT | Mod: PBBFAC,HCNC,, | Performed by: INTERNAL MEDICINE

## 2024-01-08 PROCEDURE — 80053 COMPREHEN METABOLIC PANEL: CPT | Mod: HCNC | Performed by: INTERNAL MEDICINE

## 2024-01-08 NOTE — PROGRESS NOTES
Subjective:       Patient ID: Jose Manuel Ford Jr. is a 83 y.o. male.    Chief Complaint: Annual Exam, Hyperlipidemia, Hypothyroidism, and Prostate Cancer    Hyperlipidemia  Pertinent negatives include no chest pain, myalgias or shortness of breath.     Past Medical History:   Diagnosis Date    Depression     Elevated alkaline phosphatase level 5/27/2016    HTN (hypertension)     Hypercholesteremia     Hypothyroidism     Obesity     Renal manifestation of secondary diabetes mellitus      No past surgical history on file.  Family History   Problem Relation Age of Onset    Stroke Mother     COPD Father     Cancer Neg Hx     Hearing loss Neg Hx     Kidney disease Neg Hx      Social History     Socioeconomic History    Marital status:    Tobacco Use    Smoking status: Former    Smokeless tobacco: Former     Quit date: 1/1/1968   Substance and Sexual Activity    Alcohol use: No     Alcohol/week: 0.0 standard drinks of alcohol    Drug use: No    Sexual activity: Yes     Review of Systems   Constitutional:  Negative for activity change, appetite change, chills, diaphoresis, fatigue, fever and unexpected weight change.   HENT:  Negative for drooling, ear discharge, ear pain, facial swelling, hearing loss, mouth sores, nosebleeds, postnasal drip, rhinorrhea, sinus pressure, sneezing, sore throat, tinnitus, trouble swallowing and voice change.    Eyes:  Negative for photophobia, redness and visual disturbance.   Respiratory:  Negative for apnea, cough, choking, chest tightness, shortness of breath and wheezing.    Cardiovascular:  Negative for chest pain, palpitations and leg swelling.   Gastrointestinal:  Negative for abdominal distention, abdominal pain, anal bleeding, blood in stool, constipation, diarrhea, nausea, rectal pain and vomiting.   Endocrine: Negative for cold intolerance, heat intolerance, polydipsia, polyphagia and polyuria.   Genitourinary:  Negative for difficulty urinating, dysuria, enuresis,  flank pain, frequency, genital sores, hematuria and urgency.   Musculoskeletal:  Negative for arthralgias, back pain, gait problem, joint swelling, myalgias, neck pain and neck stiffness.   Skin:  Negative for color change, pallor, rash and wound.   Allergic/Immunologic: Negative for food allergies and immunocompromised state.   Neurological:  Negative for dizziness, tremors, seizures, syncope, facial asymmetry, speech difficulty, weakness, light-headedness, numbness and headaches.   Hematological:  Negative for adenopathy. Does not bruise/bleed easily.   Psychiatric/Behavioral:  Negative for agitation, behavioral problems, confusion, decreased concentration, dysphoric mood, hallucinations, self-injury, sleep disturbance and suicidal ideas. The patient is not nervous/anxious and is not hyperactive.        Objective:      Physical Exam  Vitals and nursing note reviewed.   Constitutional:       General: He is not in acute distress.     Appearance: Normal appearance. He is well-developed. He is not diaphoretic.   HENT:      Head: Normocephalic and atraumatic.      Mouth/Throat:      Pharynx: No oropharyngeal exudate.   Eyes:      General: No scleral icterus.     Pupils: Pupils are equal, round, and reactive to light.   Neck:      Thyroid: No thyromegaly.      Vascular: No carotid bruit or JVD.      Trachea: No tracheal deviation.   Cardiovascular:      Rate and Rhythm: Normal rate and regular rhythm.      Heart sounds: Normal heart sounds.   Pulmonary:      Effort: Pulmonary effort is normal. No respiratory distress.      Breath sounds: Normal breath sounds. No wheezing or rales.   Chest:      Chest wall: No tenderness.   Abdominal:      General: Bowel sounds are normal. There is no distension.      Palpations: Abdomen is soft.      Tenderness: There is no abdominal tenderness. There is no guarding or rebound.   Musculoskeletal:         General: No tenderness. Normal range of motion.      Cervical back: Normal range of  motion and neck supple.   Lymphadenopathy:      Cervical: No cervical adenopathy.   Skin:     General: Skin is warm and dry.      Coloration: Skin is not pale.      Findings: No erythema or rash.   Neurological:      Mental Status: He is alert and oriented to person, place, and time.   Psychiatric:         Behavior: Behavior normal.         Thought Content: Thought content normal.         Judgment: Judgment normal.         CMP  Sodium   Date Value Ref Range Status   01/06/2023 140 136 - 145 mmol/L Final     Potassium   Date Value Ref Range Status   01/06/2023 5.2 (H) 3.5 - 5.1 mmol/L Final     Chloride   Date Value Ref Range Status   01/06/2023 105 95 - 110 mmol/L Final     CO2   Date Value Ref Range Status   01/06/2023 27 23 - 29 mmol/L Final     Glucose   Date Value Ref Range Status   01/06/2023 105 70 - 110 mg/dL Final     BUN   Date Value Ref Range Status   01/06/2023 22 8 - 23 mg/dL Final     Creatinine   Date Value Ref Range Status   01/06/2023 1.2 0.5 - 1.4 mg/dL Final     Calcium   Date Value Ref Range Status   01/06/2023 10.5 8.7 - 10.5 mg/dL Final     Total Protein   Date Value Ref Range Status   01/06/2023 7.6 6.0 - 8.4 g/dL Final     Albumin   Date Value Ref Range Status   01/06/2023 4.3 3.5 - 5.2 g/dL Final   09/06/2016 4.2 3.6 - 5.1 g/dL Final     Comment:     @ Test Performed By:  [facility] Mckeonjyoti Becerril M.D., [address],   [address] [place] [id number]  [facility]  [id number]       Total Bilirubin   Date Value Ref Range Status   01/06/2023 0.6 0.1 - 1.0 mg/dL Final     Comment:     For infants and newborns, interpretation of results should be based  on gestational age, weight and in agreement with clinical  observations.    Premature Infant recommended reference ranges:  Up to 24 hours.............<8.0 mg/dL  Up to 48 hours............<12.0 mg/dL  3-5 days..................<15.0 mg/dL  6-29 days.................<15.0 mg/dL       Alkaline Phosphatase    Date Value Ref Range Status   01/06/2023 137 (H) 55 - 135 U/L Final     AST   Date Value Ref Range Status   01/06/2023 19 10 - 40 U/L Final     ALT   Date Value Ref Range Status   01/06/2023 17 10 - 44 U/L Final     Anion Gap   Date Value Ref Range Status   01/06/2023 8 8 - 16 mmol/L Final     eGFR if    Date Value Ref Range Status   07/06/2022 59.6 (A) >60 mL/min/1.73 m^2 Final     eGFR if non    Date Value Ref Range Status   07/06/2022 51.5 (A) >60 mL/min/1.73 m^2 Final     Comment:     Calculation used to obtain the estimated glomerular filtration  rate (eGFR) is the CKD-EPI equation.        Lab Results   Component Value Date    WBC 4.26 01/06/2023    HGB 13.2 (L) 01/06/2023    HCT 40.1 01/06/2023    MCV 95 01/06/2023     01/06/2023     Lab Results   Component Value Date    CHOL 319 (H) 07/06/2022     Lab Results   Component Value Date    HDL 61 07/06/2022     Lab Results   Component Value Date    LDLCALC 215.0 (H) 07/06/2022     Lab Results   Component Value Date    TRIG 215 (H) 07/06/2022     Lab Results   Component Value Date    CHOLHDL 19.1 (L) 07/06/2022     Lab Results   Component Value Date    TSH 4.852 (H) 07/06/2022     Lab Results   Component Value Date    HGBA1C 5.7 08/21/2014     Assessment:       1. Hypercholesteremia    2. Hypothyroidism due to acquired atrophy of thyroid    3. Prostate cancer    4. Fatty liver disease, nonalcoholic    5. Anemia, unspecified type        Plan:   Hypercholesteremia--------------declines chol med--------  -     Comprehensive Metabolic Panel; Future; Expected date: 01/08/2024  -     Lipid Panel; Future; Expected date: 01/08/2024    Hypothyroidism due to acquired atrophy of thyroid----------declines thyroid med-  -     TSH; Future; Expected date: 01/08/2024    Prostate cancer    Fatty liver disease, nonalcoholic    Anemia, unspecified type  -     CBC Auto Differential; Future; Expected date: 01/08/2024  -     Ferritin; Future;  Expected date: 01/08/2024  -     Iron; Future; Expected date: 01/08/2024      Stable----------------------continue meds-----------------------as above---------------------------madi ramsay---------------------------f/u 1 year

## 2024-01-09 ENCOUNTER — TELEPHONE (OUTPATIENT)
Dept: FAMILY MEDICINE | Facility: CLINIC | Age: 84
End: 2024-01-09
Payer: MEDICARE

## 2024-01-09 DIAGNOSIS — R89.9 ABNORMAL LABORATORY TEST: Primary | ICD-10-CM

## 2024-01-09 LAB — T4 FREE SERPL-MCNC: 0.87 NG/DL (ref 0.71–1.51)

## 2024-01-09 NOTE — TELEPHONE ENCOUNTER
Called and reviewed labs with pt. Pt. Verbalized understanding.   
Repeat alkaline phophatase lab in 1 month----elevated-----------other labs stable----  
n/a

## 2024-03-23 DIAGNOSIS — K21.9 GASTROESOPHAGEAL REFLUX DISEASE: ICD-10-CM

## 2024-03-23 NOTE — TELEPHONE ENCOUNTER
No care due was identified.  Health [facility] Embedded Care Due Messages. Reference number: [id number].   3/23/2024 3:35:09 AM CDT

## 2024-03-25 RX ORDER — OMEPRAZOLE 20 MG/1
20 CAPSULE, DELAYED RELEASE ORAL
Qty: 90 CAPSULE | Refills: 3 | Status: SHIPPED | OUTPATIENT
Start: 2024-03-25

## 2024-03-25 NOTE — TELEPHONE ENCOUNTER
Refill Decision Note   Jose Manuel Ford  is requesting a refill authorization.  Brief Assessment and Rationale for Refill:  Approve     Medication Therapy Plan:         Comments:     Note composed:2:44 AM 03/25/2024

## 2024-05-03 ENCOUNTER — OFFICE VISIT (OUTPATIENT)
Dept: RADIATION ONCOLOGY | Facility: CLINIC | Age: 84
End: 2024-05-03
Payer: MEDICARE

## 2024-05-03 VITALS
RESPIRATION RATE: 18 BRPM | SYSTOLIC BLOOD PRESSURE: 148 MMHG | HEART RATE: 69 BPM | DIASTOLIC BLOOD PRESSURE: 71 MMHG | WEIGHT: 206.38 LBS | OXYGEN SATURATION: 97 % | HEIGHT: 69 IN | TEMPERATURE: 98 F | BODY MASS INDEX: 30.57 KG/M2

## 2024-05-03 DIAGNOSIS — C61 PROSTATE CANCER: Primary | ICD-10-CM

## 2024-05-03 PROCEDURE — 3077F SYST BP >= 140 MM HG: CPT | Mod: HCNC,CPTII,S$GLB, | Performed by: SPECIALIST

## 2024-05-03 PROCEDURE — 3288F FALL RISK ASSESSMENT DOCD: CPT | Mod: HCNC,CPTII,S$GLB, | Performed by: SPECIALIST

## 2024-05-03 PROCEDURE — 1159F MED LIST DOCD IN RCRD: CPT | Mod: HCNC,CPTII,S$GLB, | Performed by: SPECIALIST

## 2024-05-03 PROCEDURE — 99999 PR PBB SHADOW E&M-EST. PATIENT-LVL III: CPT | Mod: PBBFAC,HCNC,, | Performed by: SPECIALIST

## 2024-05-03 PROCEDURE — 1101F PT FALLS ASSESS-DOCD LE1/YR: CPT | Mod: HCNC,CPTII,S$GLB, | Performed by: SPECIALIST

## 2024-05-03 PROCEDURE — 3078F DIAST BP <80 MM HG: CPT | Mod: HCNC,CPTII,S$GLB, | Performed by: SPECIALIST

## 2024-05-03 PROCEDURE — 1126F AMNT PAIN NOTED NONE PRSNT: CPT | Mod: HCNC,CPTII,S$GLB, | Performed by: SPECIALIST

## 2024-05-03 PROCEDURE — 99213 OFFICE O/P EST LOW 20 MIN: CPT | Mod: HCNC,S$GLB,, | Performed by: SPECIALIST

## 2024-05-03 RX ORDER — OXYBUTYNIN CHLORIDE 5 MG/1
5 TABLET, EXTENDED RELEASE ORAL DAILY
Qty: 30 TABLET | Refills: 11 | Status: SHIPPED | OUTPATIENT
Start: 2024-05-03 | End: 2025-05-03

## 2024-05-03 NOTE — PROGRESS NOTES
Mr. Gee returns for follow-up after receiving neoadjuvant hormonal therapy followed by modestly hypofractionated external beam radiotherapy to his prostate seminal vesicles and pelvic lymph nodes.  He had some diarrhea for a while after treatment but this ultimately resolved with the addition of Metamucil.  His PSA that was 26.9 on 01/06/2022 was last measured by less than 0.01 on 01/06/2023.  He continues to do well.  He does not get sufficient erections and did not do well with Viagra.  At this point he does not wish to pursue this.  Does have occasional urinary urgency particularly in [place] at the end of the service when he stands up.  He has nocturia 3-4 times a night.  It is less when he lies on his side.  He does not believe he has sleep apnea.  His most recent PSA was 0.4.  Physical examination:  On rectal exam his prostate is flattening out without nodularity.  Impression: He continues to do well after treatment.  I suspect he has some bladder irritation and may respond to oxybutynin.  I have encouraged him to question is wife regarding his breathing at night.  Plan:  An oxybutynin prescription is called in.  I will see him back in 1 year.  I certainly appreciate participating in this delightful gentleman's care.

## 2024-10-07 ENCOUNTER — PATIENT MESSAGE (OUTPATIENT)
Dept: RESEARCH | Facility: HOSPITAL | Age: 84
End: 2024-10-07
Payer: MEDICARE

## 2025-01-08 ENCOUNTER — LAB VISIT (OUTPATIENT)
Dept: LAB | Facility: HOSPITAL | Age: 85
End: 2025-01-08
Attending: INTERNAL MEDICINE
Payer: MEDICARE

## 2025-01-08 ENCOUNTER — OFFICE VISIT (OUTPATIENT)
Dept: FAMILY MEDICINE | Facility: CLINIC | Age: 85
End: 2025-01-08
Payer: MEDICARE

## 2025-01-08 VITALS
HEART RATE: 87 BPM | BODY MASS INDEX: 31.19 KG/M2 | TEMPERATURE: 98 F | OXYGEN SATURATION: 97 % | DIASTOLIC BLOOD PRESSURE: 80 MMHG | WEIGHT: 210.56 LBS | HEIGHT: 69 IN | SYSTOLIC BLOOD PRESSURE: 138 MMHG

## 2025-01-08 DIAGNOSIS — L98.9 SKIN LESION: ICD-10-CM

## 2025-01-08 DIAGNOSIS — K76.0 FATTY LIVER DISEASE, NONALCOHOLIC: ICD-10-CM

## 2025-01-08 DIAGNOSIS — E78.00 HYPERCHOLESTEREMIA: ICD-10-CM

## 2025-01-08 DIAGNOSIS — C61 PROSTATE CANCER: ICD-10-CM

## 2025-01-08 DIAGNOSIS — E03.4 HYPOTHYROIDISM DUE TO ACQUIRED ATROPHY OF THYROID: ICD-10-CM

## 2025-01-08 DIAGNOSIS — C61 PROSTATE CANCER: Primary | ICD-10-CM

## 2025-01-08 DIAGNOSIS — I10 PRIMARY HYPERTENSION: ICD-10-CM

## 2025-01-08 LAB
ALBUMIN SERPL BCP-MCNC: 4.4 G/DL (ref 3.5–5.2)
ALP SERPL-CCNC: 143 U/L (ref 40–150)
ALT SERPL W/O P-5'-P-CCNC: 18 U/L (ref 10–44)
ANION GAP SERPL CALC-SCNC: 10 MMOL/L (ref 8–16)
AST SERPL-CCNC: 17 U/L (ref 10–40)
BASOPHILS # BLD AUTO: 0 K/UL (ref 0–0.2)
BASOPHILS NFR BLD: 0 % (ref 0–1.9)
BILIRUB SERPL-MCNC: 0.7 MG/DL (ref 0.1–1)
BUN SERPL-MCNC: 18 MG/DL (ref 8–23)
CALCIUM SERPL-MCNC: 10 MG/DL (ref 8.7–10.5)
CHLORIDE SERPL-SCNC: 109 MMOL/L (ref 95–110)
CHOLEST SERPL-MCNC: 290 MG/DL (ref 120–199)
CHOLEST/HDLC SERPL: 5.4 {RATIO} (ref 2–5)
CO2 SERPL-SCNC: 20 MMOL/L (ref 23–29)
COMPLEXED PSA SERPL-MCNC: 0.31 NG/ML (ref 0–4)
CREAT SERPL-MCNC: 0.9 MG/DL (ref 0.5–1.4)
DIFFERENTIAL METHOD BLD: ABNORMAL
EOSINOPHIL # BLD AUTO: 0.1 K/UL (ref 0–0.5)
EOSINOPHIL NFR BLD: 2.6 % (ref 0–8)
ERYTHROCYTE [DISTWIDTH] IN BLOOD BY AUTOMATED COUNT: 13.9 % (ref 11.5–14.5)
EST. GFR  (NO RACE VARIABLE): >60 ML/MIN/1.73 M^2
GLUCOSE SERPL-MCNC: 104 MG/DL (ref 70–110)
HCT VFR BLD AUTO: 43 % (ref 40–54)
HDLC SERPL-MCNC: 54 MG/DL (ref 40–75)
HDLC SERPL: 18.6 % (ref 20–50)
HGB BLD-MCNC: 14.2 G/DL (ref 14–18)
IMM GRANULOCYTES # BLD AUTO: 0.01 K/UL (ref 0–0.04)
IMM GRANULOCYTES NFR BLD AUTO: 0.3 % (ref 0–0.5)
LDLC SERPL CALC-MCNC: 209 MG/DL (ref 63–159)
LYMPHOCYTES # BLD AUTO: 1 K/UL (ref 1–4.8)
LYMPHOCYTES NFR BLD: 26.5 % (ref 18–48)
MCH RBC QN AUTO: 31.3 PG (ref 27–31)
MCHC RBC AUTO-ENTMCNC: 33 G/DL (ref 32–36)
MCV RBC AUTO: 95 FL (ref 82–98)
MONOCYTES # BLD AUTO: 0.5 K/UL (ref 0.3–1)
MONOCYTES NFR BLD: 11.9 % (ref 4–15)
NEUTROPHILS # BLD AUTO: 2.3 K/UL (ref 1.8–7.7)
NEUTROPHILS NFR BLD: 58.7 % (ref 38–73)
NONHDLC SERPL-MCNC: 236 MG/DL
NRBC BLD-RTO: 0 /100 WBC
PLATELET # BLD AUTO: 208 K/UL (ref 150–450)
PMV BLD AUTO: 11.1 FL (ref 9.2–12.9)
POTASSIUM SERPL-SCNC: 4 MMOL/L (ref 3.5–5.1)
PROT SERPL-MCNC: 8 G/DL (ref 6–8.4)
RBC # BLD AUTO: 4.53 M/UL (ref 4.6–6.2)
SODIUM SERPL-SCNC: 139 MMOL/L (ref 136–145)
T4 FREE SERPL-MCNC: 0.92 NG/DL (ref 0.71–1.51)
TRIGL SERPL-MCNC: 135 MG/DL (ref 30–150)
TSH SERPL DL<=0.005 MIU/L-ACNC: 5.8 UIU/ML (ref 0.4–4)
WBC # BLD AUTO: 3.88 K/UL (ref 3.9–12.7)

## 2025-01-08 PROCEDURE — 3075F SYST BP GE 130 - 139MM HG: CPT | Mod: CPTII,S$GLB,, | Performed by: INTERNAL MEDICINE

## 2025-01-08 PROCEDURE — 80061 LIPID PANEL: CPT | Performed by: INTERNAL MEDICINE

## 2025-01-08 PROCEDURE — 1159F MED LIST DOCD IN RCRD: CPT | Mod: CPTII,S$GLB,, | Performed by: INTERNAL MEDICINE

## 2025-01-08 PROCEDURE — 3288F FALL RISK ASSESSMENT DOCD: CPT | Mod: CPTII,S$GLB,, | Performed by: INTERNAL MEDICINE

## 2025-01-08 PROCEDURE — 36415 COLL VENOUS BLD VENIPUNCTURE: CPT | Mod: PO | Performed by: INTERNAL MEDICINE

## 2025-01-08 PROCEDURE — 99999 PR PBB SHADOW E&M-EST. PATIENT-LVL IV: CPT | Mod: PBBFAC,,, | Performed by: INTERNAL MEDICINE

## 2025-01-08 PROCEDURE — 84153 ASSAY OF PSA TOTAL: CPT | Performed by: INTERNAL MEDICINE

## 2025-01-08 PROCEDURE — 99214 OFFICE O/P EST MOD 30 MIN: CPT | Mod: S$GLB,,, | Performed by: INTERNAL MEDICINE

## 2025-01-08 PROCEDURE — 85025 COMPLETE CBC W/AUTO DIFF WBC: CPT | Performed by: INTERNAL MEDICINE

## 2025-01-08 PROCEDURE — G2211 COMPLEX E/M VISIT ADD ON: HCPCS | Mod: S$GLB,,, | Performed by: INTERNAL MEDICINE

## 2025-01-08 PROCEDURE — 1126F AMNT PAIN NOTED NONE PRSNT: CPT | Mod: CPTII,S$GLB,, | Performed by: INTERNAL MEDICINE

## 2025-01-08 PROCEDURE — 84443 ASSAY THYROID STIM HORMONE: CPT | Performed by: INTERNAL MEDICINE

## 2025-01-08 PROCEDURE — 80053 COMPREHEN METABOLIC PANEL: CPT | Performed by: INTERNAL MEDICINE

## 2025-01-08 PROCEDURE — 84439 ASSAY OF FREE THYROXINE: CPT | Performed by: INTERNAL MEDICINE

## 2025-01-08 PROCEDURE — 3079F DIAST BP 80-89 MM HG: CPT | Mod: CPTII,S$GLB,, | Performed by: INTERNAL MEDICINE

## 2025-01-08 PROCEDURE — 1101F PT FALLS ASSESS-DOCD LE1/YR: CPT | Mod: CPTII,S$GLB,, | Performed by: INTERNAL MEDICINE

## 2025-01-08 NOTE — PROGRESS NOTES
Subjective:       Patient ID: Jose Manuel Ford Jr. is a 84 y.o. male.    Chief Complaint: Annual Exam, Hypertension, Hyperlipidemia, and Hypothyroidism    Hypertension  Pertinent negatives include no chest pain, headaches, neck pain, palpitations or shortness of breath.   Hyperlipidemia  Pertinent negatives include no chest pain, myalgias or shortness of breath.     Past Medical History:   Diagnosis Date    Depression     Elevated alkaline phosphatase level 5/27/2016    HTN (hypertension)     Hypercholesteremia     Hypothyroidism     Obesity     Renal manifestation of secondary diabetes mellitus      No past surgical history on file.  Family History   Problem Relation Name Age of Onset    Stroke Mother      COPD Father      Cancer Neg Hx      Hearing loss Neg Hx      Kidney disease Neg Hx       Social History     Socioeconomic History    Marital status:    Tobacco Use    Smoking status: Former    Smokeless tobacco: Former     Quit date: 1/1/1968   Substance and Sexual Activity    Alcohol use: No     Alcohol/week: 0.0 standard drinks of alcohol    Drug use: No    Sexual activity: Yes     Review of Systems   Constitutional:  Negative for activity change, appetite change, chills, diaphoresis, fatigue, fever and unexpected weight change.   HENT:  Negative for drooling, ear discharge, ear pain, facial swelling, hearing loss, mouth sores, nosebleeds, postnasal drip, rhinorrhea, sinus pressure, sneezing, sore throat, tinnitus, trouble swallowing and voice change.    Eyes:  Negative for photophobia, redness and visual disturbance.   Respiratory:  Negative for apnea, cough, choking, chest tightness, shortness of breath and wheezing.    Cardiovascular:  Negative for chest pain, palpitations and leg swelling.   Gastrointestinal:  Negative for abdominal distention, abdominal pain, anal bleeding, blood in stool, constipation, diarrhea, nausea, rectal pain and vomiting.   Endocrine: Negative for cold intolerance, heat  intolerance, polydipsia, polyphagia and polyuria.   Genitourinary:  Negative for difficulty urinating, dysuria, enuresis, flank pain, frequency, genital sores, hematuria and urgency.   Musculoskeletal:  Negative for arthralgias, back pain, gait problem, joint swelling, myalgias, neck pain and neck stiffness.   Skin:  Negative for color change, pallor, rash and wound.   Allergic/Immunologic: Negative for food allergies and immunocompromised state.   Neurological:  Negative for dizziness, tremors, seizures, syncope, facial asymmetry, speech difficulty, weakness, light-headedness, numbness and headaches.   Hematological:  Negative for adenopathy. Does not bruise/bleed easily.   Psychiatric/Behavioral:  Negative for agitation, behavioral problems, confusion, decreased concentration, dysphoric mood, hallucinations, self-injury, sleep disturbance and suicidal ideas. The patient is not nervous/anxious and is not hyperactive.        Objective:      Physical Exam  Vitals and nursing note reviewed.   Constitutional:       General: He is not in acute distress.     Appearance: Normal appearance. He is well-developed. He is not diaphoretic.   HENT:      Head: Normocephalic and atraumatic.      Mouth/Throat:      Pharynx: No oropharyngeal exudate.   Eyes:      General: No scleral icterus.     Pupils: Pupils are equal, round, and reactive to light.   Neck:      Thyroid: No thyromegaly.      Vascular: No carotid bruit or JVD.      Trachea: No tracheal deviation.   Cardiovascular:      Rate and Rhythm: Normal rate and regular rhythm.      Heart sounds: Normal heart sounds.   Pulmonary:      Effort: Pulmonary effort is normal. No respiratory distress.      Breath sounds: Normal breath sounds. No wheezing or rales.   Chest:      Chest wall: No tenderness.   Abdominal:      General: Bowel sounds are normal. There is no distension.      Palpations: Abdomen is soft.      Tenderness: There is no abdominal tenderness. There is no guarding  or rebound.   Musculoskeletal:         General: No tenderness. Normal range of motion.      Cervical back: Normal range of motion and neck supple.   Lymphadenopathy:      Cervical: No cervical adenopathy.   Skin:     General: Skin is warm and dry.      Coloration: Skin is not pale.      Findings: No erythema or rash.   Neurological:      Mental Status: He is alert and oriented to person, place, and time.      Cranial Nerves: No cranial nerve deficit.      Coordination: Coordination normal.   Psychiatric:         Behavior: Behavior normal.         Thought Content: Thought content normal.         Judgment: Judgment normal.         CMP  Sodium   Date Value Ref Range Status   01/08/2024 140 136 - 145 mmol/L Final     Potassium   Date Value Ref Range Status   01/08/2024 4.5 3.5 - 5.1 mmol/L Final     Chloride   Date Value Ref Range Status   01/08/2024 107 95 - 110 mmol/L Final     CO2   Date Value Ref Range Status   01/08/2024 23 23 - 29 mmol/L Final     Glucose   Date Value Ref Range Status   01/08/2024 99 70 - 110 mg/dL Final     BUN   Date Value Ref Range Status   01/08/2024 19 8 - 23 mg/dL Final     Creatinine   Date Value Ref Range Status   01/08/2024 0.9 0.5 - 1.4 mg/dL Final     Calcium   Date Value Ref Range Status   01/08/2024 9.9 8.7 - 10.5 mg/dL Final     Total Protein   Date Value Ref Range Status   01/08/2024 7.8 6.0 - 8.4 g/dL Final     Albumin   Date Value Ref Range Status   01/08/2024 4.2 3.5 - 5.2 g/dL Final   09/06/2016 4.2 3.6 - 5.1 g/dL Final     Comment:     @ Test Performed By:  [facility]keon Mayela Becerril M.D., [address]   [id number] [place] [id number]  [facility]  [id number]       Total Bilirubin   Date Value Ref Range Status   01/08/2024 0.6 0.1 - 1.0 mg/dL Final     Comment:     For infants and newborns, interpretation of results should be based  on gestational age, weight and in agreement with clinical  observations.    Premature Infant recommended  reference ranges:  Up to 24 hours.............<8.0 mg/dL  Up to 48 hours............<12.0 mg/dL  3-5 days..................<15.0 mg/dL  6-29 days.................<15.0 mg/dL       Alkaline Phosphatase   Date Value Ref Range Status   01/08/2024 170 (H) 55 - 135 U/L Final     AST   Date Value Ref Range Status   01/08/2024 15 10 - 40 U/L Final     ALT   Date Value Ref Range Status   01/08/2024 16 10 - 44 U/L Final     Anion Gap   Date Value Ref Range Status   01/08/2024 10 8 - 16 mmol/L Final     eGFR if    Date Value Ref Range Status   07/06/2022 59.6 (A) >60 mL/min/1.73 m^2 Final     eGFR if non    Date Value Ref Range Status   07/06/2022 51.5 (A) >60 mL/min/1.73 m^2 Final     Comment:     Calculation used to obtain the estimated glomerular filtration  rate (eGFR) is the CKD-EPI equation.        Lab Results   Component Value Date    WBC 4.18 01/08/2024    HGB 14.0 01/08/2024    HCT 41.6 01/08/2024    MCV 93 01/08/2024     01/08/2024     Lab Results   Component Value Date    CHOL 271 (H) 01/08/2024     Lab Results   Component Value Date    HDL 55 01/08/2024     Lab Results   Component Value Date    LDLCALC 194.0 (H) 01/08/2024     Lab Results   Component Value Date    TRIG 110 01/08/2024     Lab Results   Component Value Date    CHOLHDL 20.3 01/08/2024     Lab Results   Component Value Date    TSH 5.819 (H) 01/08/2024     Lab Results   Component Value Date    HGBA1C 5.7 08/21/2014     Assessment:       1. Prostate cancer    2. Hypothyroidism due to acquired atrophy of thyroid    3. Hypercholesteremia    4. Fatty liver disease, nonalcoholic    5. Primary hypertension    6. Skin lesion        Plan:   Prostate cancer----------------sees urology dr ramsay---------------  -     Prostate Specific Antigen, Diagnostic; Future; Expected date: 01/08/2025    Hypothyroidism due to acquired atrophy of thyroid------------declines med---------------  -     TSH; Future; Expected date:  01/08/2025    Hypercholesteremia--------------declines med-------------  -     Comprehensive Metabolic Panel; Future; Expected date: 01/08/2025  -     CBC Auto Differential; Future; Expected date: 01/08/2025  -     Lipid Panel; Future; Expected date: 01/08/2025    Fatty liver disease, nonalcoholic    Primary hypertension    Skin lesion  -     Ambulatory referral/consult to Dermatology; Future; Expected date: 01/15/2025      Stable----------------watch diet,exercise-------------as above---------------------f/u prn or 1 year

## 2025-01-09 ENCOUNTER — PATIENT MESSAGE (OUTPATIENT)
Dept: FAMILY MEDICINE | Facility: CLINIC | Age: 85
End: 2025-01-09
Payer: MEDICARE

## 2025-03-08 DIAGNOSIS — K21.9 GASTROESOPHAGEAL REFLUX DISEASE: ICD-10-CM

## 2025-03-08 NOTE — TELEPHONE ENCOUNTER
No care due was identified.  Health [facility] Embedded Care Due Messages. Reference number: [id number].   3/08/2025 3:52:44 AM CST

## 2025-03-09 RX ORDER — OMEPRAZOLE 20 MG/1
20 CAPSULE, DELAYED RELEASE ORAL
Qty: 90 CAPSULE | Refills: 3 | Status: SHIPPED | OUTPATIENT
Start: 2025-03-09

## 2025-03-09 NOTE — TELEPHONE ENCOUNTER
Refill Decision Note   Jose Manuel Ford  is requesting a refill authorization.  Brief Assessment and Rationale for Refill:  Approve     Medication Therapy Plan:         Comments:     Note composed:1:54 PM 03/09/2025             Appointments     Last Visit   1/8/2025 Itz Welch MD   Next Visit   Visit date not found Itz Welch MD

## 2025-03-24 DIAGNOSIS — Z00.00 ENCOUNTER FOR MEDICARE ANNUAL WELLNESS EXAM: ICD-10-CM

## 2025-03-27 ENCOUNTER — OFFICE VISIT (OUTPATIENT)
Dept: DERMATOLOGY | Facility: CLINIC | Age: 85
End: 2025-03-27
Payer: MEDICARE

## 2025-03-27 ENCOUNTER — TELEPHONE (OUTPATIENT)
Dept: RADIATION ONCOLOGY | Facility: CLINIC | Age: 85
End: 2025-03-27
Payer: MEDICARE

## 2025-03-27 DIAGNOSIS — B07.9 VERRUCA: Primary | ICD-10-CM

## 2025-03-27 DIAGNOSIS — L98.9 DISEASE OF SKIN AND SUBCUTANEOUS TISSUE: ICD-10-CM

## 2025-03-27 DIAGNOSIS — L82.1 SEBORRHEIC KERATOSIS: ICD-10-CM

## 2025-03-27 DIAGNOSIS — L98.9 SKIN LESION: ICD-10-CM

## 2025-03-27 PROCEDURE — 99999 PR PBB SHADOW E&M-EST. PATIENT-LVL III: CPT | Mod: PBBFAC,,, | Performed by: DERMATOLOGY

## 2025-03-27 RX ORDER — CLOBETASOL PROPIONATE 0.5 MG/G
OINTMENT TOPICAL
Qty: 30 G | Refills: 0 | Status: SHIPPED | OUTPATIENT
Start: 2025-03-27

## 2025-03-27 NOTE — PROGRESS NOTES
Subjective:      Patient ID:  Jose Manuel Ford Jr. is a 84 y.o. male who presents for     History of Present Illness: The patient presents with chief complaint of growth.  Location: back of R calf  Duration: a decade  Signs/Symptoms: started out as an itchy spot, slowly grew, then faster growth recently; now occasional itching    Prior treatments:   OTC callus treatment    Denies personal h/o skin cancer  Denies fam h/o skin cancer in 1st degree relative    Also:  C/o rash on both flexor wrists and sometimes on the palms, itchy, comes and goes  Had similar rash on his ankle and behind his knees in the past that went away  Reports it flares when he drinks beer without eating food  Does not get blisters      He also notes that light spots on his forearms are from back when he was taking meds for cholesterol that weren't good for him and caused bad things to come out of his skin.            Review of Systems   Skin:  Positive for itching and rash. Negative for daily sunscreen use and activity-related sunscreen use.       Objective:   Physical Exam   Constitutional: He appears well-developed and well-nourished. No distress.   Neurological: He is alert and oriented to person, place, and time. He is not disoriented.   Psychiatric: He has a normal mood and affect.   Skin:   Areas Examined (abnormalities noted in diagram):   RUE Inspected  LUE Inspection Performed  RLE Inspected  LLE Inspection Performed  Nails and Digits Inspection Performed            Diagram Legend     Erythematous scaling macule/papule c/w actinic keratosis       Vascular papule c/w angioma      Pigmented verrucoid papule/plaque c/w seborrheic keratosis      Yellow umbilicated papule c/w sebaceous hyperplasia      Irregularly shaped tan macule c/w lentigo     1-2 mm smooth white papules consistent with Milia      Movable subcutaneous cyst with punctum c/w epidermal inclusion cyst      Subcutaneous movable cyst c/w pilar cyst      Firm pink to brown  papule c/w dermatofibroma      Pedunculated fleshy papule(s) c/w skin tag(s)      Evenly pigmented macule c/w junctional nevus     Mildly variegated pigmented, slightly irregular-bordered macule c/w mildly atypical nevus      Flesh colored to evenly pigmented papule c/w intradermal nevus       Pink pearly papule/plaque c/w basal cell carcinoma      Erythematous hyperkeratotic cursted plaque c/w SCC      Surgical scar with no sign of skin cancer recurrence      Open and closed comedones      Inflammatory papules and pustules      Verrucoid papule consistent consistent with wart     Erythematous eczematous patches and plaques     Dystrophic onycholytic nail with subungual debris c/w onychomycosis     Umbilicated papule    Erythematous-base heme-crusted tan verrucoid plaque consistent with inflamed seborrheic keratosis     Erythematous Silvery Scaling Plaque c/w Psoriasis     See annotation                Assessment / Plan:        Verruca  Verruca vs ISK vs less likely SCC  Discussed potential etiologies and gave option for biopsy vs trial of LN2. He elects trial of LN2.    Cryosurgery procedure note:    Verbal consent from the patient is obtained. Liquid nitrogen cryosurgery is applied to 1 verruca, as detailed in the physical exam, to produce a freeze injury. 2 consecutive freeze thaw cycles are applied to each verruca. The patient is aware that blisters (possibly blood blisters) may form.      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  Patient acknowledged understanding.             RTC 8 weeks          LOS NUMBER AND COMPLEXITY OF  PROBLEMS    COMPLEXITY OF DATA RISK TOTAL TIME (m)   34949  55024 [] 1 self-limited or minor problem [x] Minimal to none [] No treatment recommended or patient to monitor. Reassurance.  15-29  10-19   16467  43470 Low  [] 2 or more self limited or minor problems  [] 1 stable chronic illness  [] 1 acute, uncomplicated illness or injury Limited (2)  [] Prior external notes from each unique source  [] Review result of each unique test  [] Order each unique test  OR [] Independent historian Low  []  OTC medications   []  Discussed/Decision for minor skin surgery (no risk factors) 30-44  20-29   05868  77869 Moderate  [x]  1 or more chronic unstable illness (not at goal or progression or exacerbation) or SE of treatment  []  2 or more stable chronic illnesses  []  1 acute illness with systemic symptoms  []  1 acute complicated injury  []  1 undiagnosed new problem with uncertain prognosis Moderate (1/3 below)  []  3 or more data items        *Now includes independent historian  []  Independent interpretation of a test  []  Discuss management/test with another provider Moderate  [x]  Prescription drug mgmt  []  Discussed/Decision for Minor surgery with risk factors  []  Mgmt limited by social determinates 45-59  30-39   51296  89030 High  []  1 or more chronic illness with severe exacerbation, progression or SE of treatment  []  1 acute or chronic illness/injury that poses a threat to life or bodily function Extensive (2/3 below)  []  3 or more data items        *Now includes independent historian.  []  Independent interpretation of a test  []  Discuss management/test with another provider High  []  Major surgery with risk discussed  []  Drug therapy requiring intensive monitoring for toxicity  []  Hospitalization  []  Decision for DNR 60-74  40-54

## 2025-03-27 NOTE — PATIENT INSTRUCTIONS
CRYOSURGERY      Your doctor has used a method called cryosurgery to treat your skin condition. Cryosurgery refers to the use of very cold substances to treat a variety of skin conditions such as warts, pre-skin cancers, molluscum contagiosum, sun spots, and several benign growths. The substance we use in cryosurgery is liquid nitrogen and is so cold (-195 degrees Celsius) that it burns when administered.     Following treatment in the office, the skin may immediately burn and become red. You may find the area around the lesion is affected as well. It is sometimes necessary to treat not only the lesion, but a small area of the surrounding normal skin to achieve a good response.     A blister, and even a blood filled blister, may form after treatment.   This is a normal response. If the blister is painful, it is acceptable to sterilize a needle with rubbing alcohol and gently pop the blister. It is important that you gently wash the area with soap and warm water as the blister fluid may contain wart virus if a wart was treated. Do not remove the roof of the blister.     The area treated can take anywhere from 1-3 weeks to heal. Healing time depends on the kind of skin lesion treated, the location, and how aggressively the lesion was treated. It is recommended that the areas treated are covered with Vaseline or bacitracin ointment and a band-aid. If a band-aid is not practical, just ointment applied several times per day will do. Keeping these areas moist will speed the healing time.    Treatment with liquid nitrogen can leave a scar. In dark skin, it may be a light or dark scar, in light skin it may be a white or pink scar. These will generally fade with time, but may never go away completely.     If you have any concerns after your treatment, please feel free to call the office.       [address] [facility] [place] [address]/ [phone] FAX/ [email or web address]

## 2025-03-27 NOTE — TELEPHONE ENCOUNTER
Attempted to call patient to schedule f/u appt with Dr Becerril but there was no answer. Left a detailed message including our direct number to call back to schedule appt.

## 2025-04-09 ENCOUNTER — TELEPHONE (OUTPATIENT)
Dept: RADIATION ONCOLOGY | Facility: CLINIC | Age: 85
End: 2025-04-09
Payer: MEDICARE

## 2025-05-07 ENCOUNTER — OFFICE VISIT (OUTPATIENT)
Dept: RADIATION ONCOLOGY | Facility: CLINIC | Age: 85
End: 2025-05-07
Payer: MEDICARE

## 2025-05-07 ENCOUNTER — LAB VISIT (OUTPATIENT)
Dept: LAB | Facility: HOSPITAL | Age: 85
End: 2025-05-07
Attending: SPECIALIST
Payer: MEDICARE

## 2025-05-07 VITALS
BODY MASS INDEX: 31.51 KG/M2 | RESPIRATION RATE: 19 BRPM | SYSTOLIC BLOOD PRESSURE: 168 MMHG | HEIGHT: 69 IN | DIASTOLIC BLOOD PRESSURE: 83 MMHG | TEMPERATURE: 98 F | HEART RATE: 75 BPM | OXYGEN SATURATION: 96 % | WEIGHT: 212.75 LBS

## 2025-05-07 DIAGNOSIS — C61 PROSTATE CANCER: Primary | ICD-10-CM

## 2025-05-07 DIAGNOSIS — N32.81 DETRUSOR INSTABILITY: ICD-10-CM

## 2025-05-07 DIAGNOSIS — C61 PROSTATE CANCER: ICD-10-CM

## 2025-05-07 LAB — PSA SERPL-MCNC: 0.27 NG/ML

## 2025-05-07 PROCEDURE — 3077F SYST BP >= 140 MM HG: CPT | Mod: CPTII,S$GLB,, | Performed by: SPECIALIST

## 2025-05-07 PROCEDURE — 3288F FALL RISK ASSESSMENT DOCD: CPT | Mod: CPTII,S$GLB,, | Performed by: SPECIALIST

## 2025-05-07 PROCEDURE — 1159F MED LIST DOCD IN RCRD: CPT | Mod: CPTII,S$GLB,, | Performed by: SPECIALIST

## 2025-05-07 PROCEDURE — 36415 COLL VENOUS BLD VENIPUNCTURE: CPT

## 2025-05-07 PROCEDURE — 1101F PT FALLS ASSESS-DOCD LE1/YR: CPT | Mod: CPTII,S$GLB,, | Performed by: SPECIALIST

## 2025-05-07 PROCEDURE — 1126F AMNT PAIN NOTED NONE PRSNT: CPT | Mod: CPTII,S$GLB,, | Performed by: SPECIALIST

## 2025-05-07 PROCEDURE — 99999 PR PBB SHADOW E&M-EST. PATIENT-LVL III: CPT | Mod: PBBFAC,,, | Performed by: SPECIALIST

## 2025-05-07 PROCEDURE — 3079F DIAST BP 80-89 MM HG: CPT | Mod: CPTII,S$GLB,, | Performed by: SPECIALIST

## 2025-05-07 PROCEDURE — 99212 OFFICE O/P EST SF 10 MIN: CPT | Mod: S$GLB,,, | Performed by: SPECIALIST

## 2025-05-07 PROCEDURE — 84153 ASSAY OF PSA TOTAL: CPT

## 2025-05-07 RX ORDER — MIRABEGRON 25 MG/1
25 TABLET, FILM COATED, EXTENDED RELEASE ORAL DAILY
Qty: 30 TABLET | Refills: 11 | Status: SHIPPED | OUTPATIENT
Start: 2025-05-07 | End: 2026-05-07

## 2025-05-07 NOTE — PROGRESS NOTES
Mr. Gee returns for follow-up after being treated with neoadjuvant hormonal therapy and modestly hypofractionated external beam radiotherapy to his prostate, seminal vesicles and pelvic lymph nodes.  His pretreatment PSA was 26.9 and dropped down to less than 0.01 as his jose ramon on 01/06/2023.  Three months ago is measured at 0.31.  He wears pads at night because of slight urge incontinence with nocturia twice nightly.  The oxybutynin I ordered did not help much.  Physical exam: On rectal exam his prostate is normal.  The rest of the rectal exam is unremarkable.  Impression: I believe he will benefit from a trial of Myrbetriq and have ordered it.  I have also asked him to have a PSA drawn today as it was detectable 3 months ago.   Plan:  I will see him back in 6 months' time.  I certainly appreciate participating in this delightful gentleman's care.

## 2025-05-08 ENCOUNTER — RESULTS FOLLOW-UP (OUTPATIENT)
Dept: RADIATION ONCOLOGY | Facility: CLINIC | Age: 85
End: 2025-05-08

## 2025-05-19 ENCOUNTER — OFFICE VISIT (OUTPATIENT)
Dept: DERMATOLOGY | Facility: CLINIC | Age: 85
End: 2025-05-19
Payer: MEDICARE

## 2025-05-19 DIAGNOSIS — B07.9 VERRUCA: Primary | ICD-10-CM

## 2025-05-19 DIAGNOSIS — L98.9 DISEASE OF SKIN AND SUBCUTANEOUS TISSUE: ICD-10-CM

## 2025-05-19 PROCEDURE — 99999 PR PBB SHADOW E&M-EST. PATIENT-LVL II: CPT | Mod: PBBFAC,,, | Performed by: DERMATOLOGY

## 2025-05-19 PROCEDURE — G2211 COMPLEX E/M VISIT ADD ON: HCPCS | Mod: S$GLB,,, | Performed by: DERMATOLOGY

## 2025-05-19 PROCEDURE — 3288F FALL RISK ASSESSMENT DOCD: CPT | Mod: CPTII,S$GLB,, | Performed by: DERMATOLOGY

## 2025-05-19 PROCEDURE — 99213 OFFICE O/P EST LOW 20 MIN: CPT | Mod: S$GLB,,, | Performed by: DERMATOLOGY

## 2025-05-19 PROCEDURE — 1160F RVW MEDS BY RX/DR IN RCRD: CPT | Mod: CPTII,S$GLB,, | Performed by: DERMATOLOGY

## 2025-05-19 PROCEDURE — 1101F PT FALLS ASSESS-DOCD LE1/YR: CPT | Mod: CPTII,S$GLB,, | Performed by: DERMATOLOGY

## 2025-05-19 PROCEDURE — 1159F MED LIST DOCD IN RCRD: CPT | Mod: CPTII,S$GLB,, | Performed by: DERMATOLOGY

## 2025-05-19 PROCEDURE — 1126F AMNT PAIN NOTED NONE PRSNT: CPT | Mod: CPTII,S$GLB,, | Performed by: DERMATOLOGY

## 2025-05-19 NOTE — PROGRESS NOTES
Subjective:      Patient ID:  Jose Manuel Ford Jr. is a 84 y.o. male who presents for   Chief Complaint   Patient presents with    Follow-up     Last seen 3/27/25 for initial eval, R calf verruca vs ISK treated with LN2, and dermatitis (LP vs psoriasis vs lichenified eczema) of the flexor wrists tx with clob ointment.    He reports rash on wrists have resolved and rash on palm of hand is improving (he is only using the clob ointment every few days).  He reports the area on his calf that was treated with LN2 has shrunk in size but still has scab.    Review of Systems   Skin:  Positive for rash. Negative for itching.       Objective:   Physical Exam   Constitutional: He appears well-developed and well-nourished. No distress.   Neurological: He is alert and oriented to person, place, and time. He is not disoriented.   Psychiatric: He has a normal mood and affect.   Skin:   Areas Examined (abnormalities noted in diagram):   RUE Inspected  LUE Inspection Performed  RLE Inspected  LLE Inspection Performed  Nails and Digits Inspection Performed            Diagram Legend     Erythematous scaling macule/papule c/w actinic keratosis       Vascular papule c/w angioma      Pigmented verrucoid papule/plaque c/w seborrheic keratosis      Yellow umbilicated papule c/w sebaceous hyperplasia      Irregularly shaped tan macule c/w lentigo     1-2 mm smooth white papules consistent with Milia      Movable subcutaneous cyst with punctum c/w epidermal inclusion cyst      Subcutaneous movable cyst c/w pilar cyst      Firm pink to brown papule c/w dermatofibroma      Pedunculated fleshy papule(s) c/w skin tag(s)      Evenly pigmented macule c/w junctional nevus     Mildly variegated pigmented, slightly irregular-bordered macule c/w mildly atypical nevus      Flesh colored to evenly pigmented papule c/w intradermal nevus       Pink pearly papule/plaque c/w basal cell carcinoma      Erythematous hyperkeratotic cursted plaque c/w SCC       Surgical scar with no sign of skin cancer recurrence      Open and closed comedones      Inflammatory papules and pustules      Verrucoid papule consistent consistent with wart     Erythematous eczematous patches and plaques     Dystrophic onycholytic nail with subungual debris c/w onychomycosis     Umbilicated papule    Erythematous-base heme-crusted tan verrucoid plaque consistent with inflamed seborrheic keratosis     Erythematous Silvery Scaling Plaque c/w Psoriasis     See annotation      Assessment / Plan:        Verruca  Verruca vs ISK vs less likely SCC  Improved but with shallow wound s/p LN2 - would like to follow to resolution  Discussed wound care - cleanse daily with gentle soap and water, apply vaseline and bandage once or twice a day, keep covered at all times  - if no improvement, plan for biopsy        Disease of skin and subcutaneous tissue  - classic location for LP but history and exam appears psoriatic vs lichenified eczematous today  - currently with residual PIPA today  - continue clob ointment prn 2-4 weeks per course     Side effect profile reviewed for topical steroids including atrophy, telangiectasia and striae development with prolonged usage.  Patient acknowledged understanding.         Follow up in about 4 weeks (around 6/16/2025). For spot check R posterior calf

## 2025-05-19 NOTE — PATIENT INSTRUCTIONS
Gently wash area on leg twice daily with soap and water, then apply vaseline and a bandage.  Keep covered with vaseline and a bandage at all times.

## 2025-06-24 NOTE — PROGRESS NOTES
Subjective:       Patient ID: Jose Manuel Ford Jr. is a 79 y.o. male.    Chief Complaint: Annual Exam; Hypertension; Hyperlipidemia; and Hypothyroidism    Hypertension   Pertinent negatives include no chest pain, headaches, neck pain, palpitations or shortness of breath.   Hyperlipidemia   Pertinent negatives include no chest pain, myalgias or shortness of breath.     Past Medical History:   Diagnosis Date    Depression     Elevated alkaline phosphatase level 5/27/2016    HTN (hypertension)     Hypercholesteremia     Hypothyroidism     Obesity     Renal manifestation of secondary diabetes mellitus      No past surgical history on file.  Family History   Problem Relation Age of Onset    Stroke Mother     COPD Father     Cancer Neg Hx     Hearing loss Neg Hx     Kidney disease Neg Hx      Social History     Socioeconomic History    Marital status:      Spouse name: Not on file    Number of children: Not on file    Years of education: Not on file    Highest education level: Not on file   Occupational History    Not on file   Social Needs    Financial resource strain: Not on file    Food insecurity:     Worry: Not on file     Inability: Not on file    Transportation needs:     Medical: Not on file     Non-medical: Not on file   Tobacco Use    Smoking status: Former Smoker    Smokeless tobacco: Former User     Quit date: 1/1/1968   Substance and Sexual Activity    Alcohol use: No     Alcohol/week: 0.0 standard drinks    Drug use: No    Sexual activity: Yes   Lifestyle    Physical activity:     Days per week: Not on file     Minutes per session: Not on file    Stress: Not on file   Relationships    Social connections:     Talks on phone: Not on file     Gets together: Not on file     Attends Jewish service: Not on file     Active member of club or organization: Not on file     Attends meetings of clubs or organizations: Not on file     Relationship status: Not on file   Other  Topics Concern    Not on file   Social History Narrative    Not on file     Review of Systems   Constitutional: Negative for activity change, appetite change, chills, diaphoresis, fatigue, fever and unexpected weight change.   HENT: Negative for drooling, ear discharge, ear pain, facial swelling, hearing loss, mouth sores, nosebleeds, postnasal drip, rhinorrhea, sinus pressure, sneezing, sore throat, tinnitus, trouble swallowing and voice change.    Eyes: Negative for photophobia, redness and visual disturbance.   Respiratory: Negative for apnea, cough, choking, chest tightness, shortness of breath and wheezing.    Cardiovascular: Negative for chest pain, palpitations and leg swelling.   Gastrointestinal: Negative for abdominal distention, abdominal pain, anal bleeding, blood in stool, constipation, diarrhea, nausea and vomiting.   Endocrine: Negative for cold intolerance, heat intolerance, polydipsia, polyphagia and polyuria.   Genitourinary: Negative for difficulty urinating, dysuria, enuresis, flank pain, frequency, genital sores, hematuria and urgency.   Musculoskeletal: Negative for arthralgias, back pain, gait problem, joint swelling, myalgias, neck pain and neck stiffness.   Skin: Negative for color change, pallor, rash and wound.   Allergic/Immunologic: Negative for food allergies and immunocompromised state.   Neurological: Negative for dizziness, tremors, seizures, syncope, facial asymmetry, speech difficulty, weakness, light-headedness, numbness and headaches.   Hematological: Negative for adenopathy. Does not bruise/bleed easily.   Psychiatric/Behavioral: Negative for agitation, behavioral problems, confusion, decreased concentration, dysphoric mood, hallucinations, self-injury, sleep disturbance and suicidal ideas. The patient is not nervous/anxious and is not hyperactive.        Objective:      Physical Exam   Constitutional: He is oriented to person, place, and time. He appears well-developed and  SSKI Counseling:  I discussed with the patient the risks of SSKI including but not limited to thyroid abnormalities, metallic taste, GI upset, fever, headache, acne, arthralgias, paraesthesias, lymphadenopathy, easy bleeding, arrhythmias, and allergic reaction. well-nourished. No distress.   HENT:   Head: Normocephalic and atraumatic.   Eyes: Pupils are equal, round, and reactive to light.   Neck: Normal range of motion. Neck supple. No JVD present. Carotid bruit is not present. No tracheal deviation present. No thyromegaly present.   Cardiovascular: Normal rate, regular rhythm, normal heart sounds and intact distal pulses.   Pulmonary/Chest: Effort normal and breath sounds normal. No respiratory distress. He has no wheezes. He has no rales. He exhibits no tenderness.   Abdominal: Soft. Bowel sounds are normal. He exhibits no distension. There is no tenderness. There is no rebound and no guarding.   Musculoskeletal: Normal range of motion. He exhibits no edema or tenderness.   Lymphadenopathy:     He has no cervical adenopathy.   Neurological: He is alert and oriented to person, place, and time.   Skin: Skin is warm and dry. No rash noted. He is not diaphoretic. No erythema. No pallor.   Psychiatric: He has a normal mood and affect. His behavior is normal. Judgment and thought content normal.   Nursing note and vitals reviewed.      CMP  Sodium   Date Value Ref Range Status   10/30/2018 139 136 - 145 mmol/L Final     Potassium   Date Value Ref Range Status   10/30/2018 5.4 (H) 3.5 - 5.1 mmol/L Final     Comment:     *No Visible Hemolysis     Chloride   Date Value Ref Range Status   10/30/2018 107 95 - 110 mmol/L Final     CO2   Date Value Ref Range Status   10/30/2018 25 23 - 29 mmol/L Final     Glucose   Date Value Ref Range Status   10/30/2018 97 70 - 110 mg/dL Final     BUN, Bld   Date Value Ref Range Status   10/30/2018 13 8 - 23 mg/dL Final     Creatinine   Date Value Ref Range Status   10/30/2018 1.2 0.5 - 1.4 mg/dL Final     Calcium   Date Value Ref Range Status   10/30/2018 9.9 8.7 - 10.5 mg/dL Final     Total Protein   Date Value Ref Range Status   10/30/2018 7.8 6.0 - 8.4 g/dL Final     Albumin   Date Value Ref Range Status   10/30/2018 4.2 3.5 - 5.2 g/dL Final    09/06/2016 4.2 3.6 - 5.1 g/dL Final     Comment:     @ Test Performed By:  [employer] TONO Rm M.D..,   [address] [place] [address]  [facility]  [id number]       Total Bilirubin   Date Value Ref Range Status   10/30/2018 0.6 0.1 - 1.0 mg/dL Final     Comment:     For infants and newborns, interpretation of results should be based  on gestational age, weight and in agreement with clinical  observations.  Premature Infant recommended reference ranges:  Up to 24 hours.............<8.0 mg/dL  Up to 48 hours............<12.0 mg/dL  3-5 days..................<15.0 mg/dL  6-29 days.................<15.0 mg/dL       Alkaline Phosphatase   Date Value Ref Range Status   10/30/2018 126 55 - 135 U/L Final     AST   Date Value Ref Range Status   10/30/2018 19 10 - 40 U/L Final     ALT   Date Value Ref Range Status   10/30/2018 20 10 - 44 U/L Final     Anion Gap   Date Value Ref Range Status   10/30/2018 7 (L) 8 - 16 mmol/L Final     eGFR if    Date Value Ref Range Status   10/30/2018 >60.0 >60 mL/min/1.73 m^2 Final     eGFR if non    Date Value Ref Range Status   10/30/2018 58.0 (A) >60 mL/min/1.73 m^2 Final     Comment:     Calculation used to obtain the estimated glomerular filtration  rate (eGFR) is the CKD-EPI equation.        Lab Results   Component Value Date    WBC 4.37 10/30/2018    HGB 15.1 10/30/2018    HCT 45.3 10/30/2018    MCV 93 10/30/2018     10/30/2018     Lab Results   Component Value Date    CHOL 255 (H) 10/30/2018     Lab Results   Component Value Date    HDL 42 10/30/2018     Lab Results   Component Value Date    LDLCALC 179.8 (H) 10/30/2018     Lab Results   Component Value Date    TRIG 166 (H) 10/30/2018     Lab Results   Component Value Date    CHOLHDL 16.5 (L) 10/30/2018     Lab Results   Component Value Date    TSH 6.451 (H) 10/30/2018     Lab Results   Component Value Date    HGBA1C 5.7 08/21/2014  Qbrexza Counseling:  I discussed with the patient the risks of Qbrexza including but not limited to headache, mydriasis, blurred vision, dry eyes, nasal dryness, dry mouth, dry throat, dry skin, urinary hesitation, and constipation.  Local skin reactions including erythema, burning, stinging, and itching can also occur.     Assessment:       1. Essential hypertension    2. Hypercholesteremia    3. Hypothyroidism due to acquired atrophy of thyroid    4. Gastroesophageal reflux disease without esophagitis    5. Fatty liver disease, nonalcoholic    6. Elevated PSA        Plan:   Essential hypertension------------declines bp med as recommended----------  -     Comprehensive metabolic panel; Future; Expected date: 11/20/2019  -     CBC auto differential; Future; Expected date: 11/20/2019    Hypercholesteremia-----------declines statin as recommended----------  -     Lipid panel; Future; Expected date: 11/20/2019    Hypothyroidism due to acquired atrophy of thyroid----------declines thyroid med------  -     TSH; Future; Expected date: 11/20/2019    Gastroesophageal reflux disease without esophagitis----------ppi prn.    Fatty liver disease, nonalcoholic    Elevated PSA  -     Prostate Specific Antigen, Diagnostic; Future; Expected date: 11/20/2019    F/u 6-12 months---------------------declines colon, fitkit----------   Zoryve Counseling:  I discussed with the patient that Zoryve is not for use in the eyes, mouth or vagina. The most commonly reported side effects include diarrhea, headache, insomnia, application site pain, upper respiratory tract infections, and urinary tract infections.  All of the patient's questions and concerns were addressed. Amzeeq Pregnancy And Lactation Text: It is not recommended to use the product if you are pregnant. Taltz Pregnancy And Lactation Text: The risk during pregnancy and breastfeeding is uncertain with this medication. Infliximab Pregnancy And Lactation Text: This medication is Pregnancy Category B and is considered safe during pregnancy. It is unknown if this medication is excreted in breast milk. Hydroquinone Counseling:  Patient advised that medication may result in skin irritation, lightening (hypopigmentation), dryness, and burning.  In the event of skin irritation, the patient was advised to reduce the amount of the drug applied or use it less frequently.  Rarely, spots that are treated with hydroquinone can become darker (pseudoochronosis).  Should this occur, patient instructed to stop medication and call the office. The patient verbalized understanding of the proper use and possible adverse effects of hydroquinone.  All of the patient's questions and concerns were addressed. Topical Metronidazole Pregnancy And Lactation Text: This medication is Pregnancy Category B and considered safe during pregnancy.  It is also considered safe to use while breastfeeding. Clindamycin Counseling: I counseled the patient regarding use of clindamycin as an antibiotic for prophylactic and/or therapeutic purposes. Clindamycin is active against numerous classes of bacteria, including skin bacteria. Side effects may include nausea, diarrhea, gastrointestinal upset, rash, hives, yeast infections, and in rare cases, colitis. Hydroxychloroquine Counseling:  I discussed with the patient that a baseline ophthalmologic exam is needed at the start of therapy and every year thereafter while on therapy. A CBC may also be warranted for monitoring.  The side effects of this medication were discussed with the patient, including but not limited to agranulocytosis, aplastic anemia, seizures, rashes, retinopathy, and liver toxicity. Patient instructed to call the office should any adverse effect occur.  The patient verbalized understanding of the proper use and possible adverse effects of Plaquenil.  All the patient's questions and concerns were addressed. Bimzelx Pregnancy And Lactation Text: This medication crosses the placenta and the safety is uncertain during pregnancy. It is unknown if this medication is present in breast milk. Clindamycin Pregnancy And Lactation Text: This medication can be used in pregnancy if certain situations. Clindamycin is also present in breast milk. Nemluvio Counseling: I discussed with the patient the risks of nemolizumab including but not limited to headache, gastrointestinal complaints, nasopharyngitis, musculoskeletal complaints, injection site reactions, and allergic reactions. The patient understands that monitoring is required and they must alert us or the primary physician if any side effects are noted. Hydroxychloroquine Pregnancy And Lactation Text: This medication has been shown to cause fetal harm but it isn't assigned a Pregnancy Risk Category. There are small amounts excreted in breast milk. Hydroquinone Pregnancy And Lactation Text: This medication has not been assigned a Pregnancy Risk Category but animal studies failed to show danger with the topical medication. It is unknown if the medication is excreted in breast milk. Topical Steroids Counseling: I discussed with the patient that prolonged use of topical steroids can result in the increased appearance of superficial blood vessels (telangiectasias), lightening (hypopigmentation) and thinning of the skin (atrophy).  Patient understands to avoid using high potency steroids in skin folds, the groin or the face.  The patient verbalized understanding of the proper use and possible adverse effects of topical steroids.  All of the patient's questions and concerns were addressed. Dutasteride Male Counseling: Dustasteride Counseling:  I discussed with the patient the risks of use of dutasteride including but not limited to decreased libido, decreased ejaculate volume, and gynecomastia. Women who can become pregnant should not handle medication.  All of the patient's questions and concerns were addressed. Cantharidin Pregnancy And Lactation Text: This medication has not been proven safe during pregnancy. It is unknown if this medication is excreted in breast milk. Qbrexza Pregnancy And Lactation Text: There is no available data on Qbrexza use in pregnant women.  There is no available data on Qbrexza use in lactation. Zoryve Pregnancy And Lactation Text: It is unknown if this medication can cause problems during pregnancy and breastfeeding. Sski Pregnancy And Lactation Text: This medication is Pregnancy Category D and isn't considered safe during pregnancy. It is excreted in breast milk. Thalidomide Counseling: I discussed with the patient the risks of thalidomide including but not limited to birth defects, anxiety, weakness, chest pain, dizziness, cough and severe allergy. Zyclara Counseling:  I discussed with the patient the risks of imiquimod including but not limited to erythema, scaling, itching, weeping, crusting, and pain.  Patient understands that the inflammatory response to imiquimod is variable from person to person and was educated regarded proper titration schedule.  If flu-like symptoms develop, patient knows to discontinue the medication and contact us. Rhofade Counseling: Rhofade is a topical medication which can decrease superficial blood flow where applied. Side effects are uncommon and include stinging, redness and allergic reactions. Albendazole Counseling:  I discussed with the patient the risks of albendazole including but not limited to cytopenia, kidney damage, nausea/vomiting and severe allergy.  The patient understands that this medication is being used in an off-label manner. Ozempic Counseling: I reviewed the possible side effects including: thyroid tumors, kidney disease, gallbladder disease, abdominal pain, constipation, diarrhea, nausea, vomiting and pancreatitis. Do not take this medication if you have a history or family history of multiple endocrine neoplasia syndrome type 2. Side effects reviewed, pt to contact office should one occur. Azelaic Acid Counseling: Patient counseled that medicine may cause skin irritation and to avoid applying near the eyes.  In the event of skin irritation, the patient was advised to reduce the amount of the drug applied or use it less frequently.   The patient verbalized understanding of the proper use and possible adverse effects of azelaic acid.  All of the patient's questions and concerns were addressed. Cimzia Counseling:  I discussed with the patient the risks of Cimzia including but not limited to immunosuppression, allergic reactions and infections.  The patient understands that monitoring is required including a PPD at baseline and must alert us or the primary physician if symptoms of infection or other concerning signs are noted. Tremfya Counseling: I discussed with the patient the risks of guselkumab including but not limited to immunosuppression, serious infections, and drug reactions.  The patient understands that monitoring is required including a PPD at baseline and must alert us or the primary physician if symptoms of infection or other concerning signs are noted. Imiquimod Counseling:  I discussed with the patient the risks of imiquimod including but not limited to erythema, scaling, itching, weeping, crusting, and pain.  Patient understands that the inflammatory response to imiquimod is variable from person to person and was educated regarded proper titration schedule.  If flu-like symptoms develop, patient knows to discontinue the medication and contact us. Cimzia Pregnancy And Lactation Text: This medication crosses the placenta but can be considered safe in certain situations. Cimzia may be excreted in breast milk. Topical Steroids Applications Pregnancy And Lactation Text: Most topical steroids are considered safe to use during pregnancy and lactation.  Any topical steroid applied to the breast or nipple should be washed off before breastfeeding. Dutasteride Female Counseling: Dutasteride Counseling:  I discussed with the patient the risks of use of dutasteride including but not limited to decreased libido and sexual dysfunction. Explained the teratogenic nature of the medication and stressed the importance of not getting pregnant during treatment. All of the patient's questions and concerns were addressed. Doxycycline Counseling:  Patient counseled regarding possible photosensitivity and increased risk for sunburn.  Patient instructed to avoid sunlight, if possible.  When exposed to sunlight, patients should wear protective clothing, sunglasses, and sunscreen.  The patient was instructed to call the office immediately if the following severe adverse effects occur:  hearing changes, easy bruising/bleeding, severe headache, or vision changes.  The patient verbalized understanding of the proper use and possible adverse effects of doxycycline.  All of the patient's questions and concerns were addressed. Acitretin Counseling:  I discussed with the patient the risks of acitretin including but not limited to hair loss, dry lips/skin/eyes, liver damage, hyperlipidemia, depression/suicidal ideation, photosensitivity.  Serious rare side effects can include but are not limited to pancreatitis, pseudotumor cerebri, bony changes, clot formation/stroke/heart attack.  Patient understands that alcohol is contraindicated since it can result in liver toxicity and significantly prolong the elimination of the drug by many years. Low Dose Naltrexone Counseling- I discussed with the patient the potential risks and side effects of low dose naltrexone including but not limited to: more vivid dreams, headaches, nausea, vomiting, abdominal pain, fatigue, dizziness, and anxiety. Albendazole Pregnancy And Lactation Text: This medication is Pregnancy Category C and it isn't known if it is safe during pregnancy. It is also excreted in breast milk. Thalidomide Pregnancy And Lactation Text: This medication is Pregnancy Category X and is absolutely contraindicated during pregnancy. It is unknown if it is excreted in breast milk. Rhofade Pregnancy And Lactation Text: This medication has not been assigned a Pregnancy Risk Category. It is unknown if the medication is excreted in breast milk. Oxempic Pregnancy And Lactation Text: The fetal risk of this medication is unknown and taking while pregnant is not recommended. It is unknown if this medication is present in breast milk. Azelaic Acid Pregnancy And Lactation Text: This medication is considered safe during pregnancy and breast feeding. Zyclara Pregnancy And Lactation Text: This medication is Pregnancy Category C. It is unknown if this medication is excreted in breast milk. Nemluvio Pregnancy And Lactation Text: It is not known if Nemluvio causes fetal harm or is present in breast milk. Please proceed with caution if patients who are pregnant or breastfeeding. Saxenda Counseling: I reviewed the possible side effects including: thyroid tumors, kidney disease, gallbladder disease, abdominal pain, constipation, diarrhea, nausea, vomiting and pancreatitis. Do not take this medication if you have a history or family history of multiple endocrine neoplasia syndrome type 2. Side effects reviewed, pt to contact office should one occur. Erivedge Counseling- I discussed with the patient the risks of Erivedge including but not limited to nausea, vomiting, diarrhea, constipation, weight loss, changes in the sense of taste, decreased appetite, muscle spasms, and hair loss.  The patient verbalized understanding of the proper use and possible adverse effects of Erivedge.  All of the patient's questions and concerns were addressed. Rituxan Counseling:  I discussed with the patient the risks of Rituxan infusions. Side effects can include infusion reactions, severe drug rashes including mucocutaneous reactions, reactivation of latent hepatitis and other infections and rarely progressive multifocal leukoencephalopathy.  All of the patient's questions and concerns were addressed. Low Dose Naltrexone Pregnancy And Lactation Text: Naltrexone is pregnancy category C.  There have been no adequate and well-controlled studies in pregnant women.  It should be used in pregnancy only if the potential benefit justifies the potential risk to the fetus.   Limited data indicates that naltrexone is minimally excreted into breastmilk. Cosentyx Counseling:  I discussed with the patient the risks of Cosentyx including but not limited to worsening of Crohn's disease, immunosuppression, allergic reactions and infections.  The patient understands that monitoring is required including a PPD at baseline and must alert us or the primary physician if symptoms of infection or other concerning signs are noted. Xolair Counseling:  Patient informed of potential adverse effects including but not limited to fever, muscle aches, rash and allergic reactions.  The patient verbalized understanding of the proper use and possible adverse effects of Xolair.  All of the patient's questions and concerns were addressed. Topical Sulfur Applications Counseling: Topical Sulfur Counseling: Patient counseled that this medication may cause skin irritation or allergic reactions.  In the event of skin irritation, the patient was advised to reduce the amount of the drug applied or use it less frequently.   The patient verbalized understanding of the proper use and possible adverse effects of topical sulfur application.  All of the patient's questions and concerns were addressed. Acitretin Pregnancy And Lactation Text: This medication is Pregnancy Category X and should not be given to women who are pregnant or may become pregnant in the future. This medication is excreted in breast milk. Doxycycline Pregnancy And Lactation Text: This medication is Pregnancy Category D and not consider safe during pregnancy. It is also excreted in breast milk but is considered safe for shorter treatment courses. Dutasteride Pregnancy And Lactation Text: This medication is absolutely contraindicated in women, especially during pregnancy and breast feeding. Feminization of male fetuses is possible if taking while pregnant. Finasteride Male Counseling: Finasteride Counseling:  I discussed with the patient the risks of use of finasteride including but not limited to decreased libido, decreased ejaculate volume, gynecomastia, and depression. Women should not handle medication.  All of the patient's questions and concerns were addressed. Erythromycin Counseling:  I discussed with the patient the risks of erythromycin including but not limited to GI upset, allergic reaction, drug rash, diarrhea, increase in liver enzymes, and yeast infections. Bexarotene Counseling:  I discussed with the patient the risks of bexarotene including but not limited to hair loss, dry lips/skin/eyes, liver abnormalities, hyperlipidemia, pancreatitis, depression/suicidal ideation, photosensitivity, drug rash/allergic reactions, hypothyroidism, anemia, leukopenia, infection, cataracts, and teratogenicity.  Patient understands that they will need regular blood tests to check lipid profile, liver function tests, white blood cell count, thyroid function tests and pregnancy test if applicable. Ivermectin Counseling:  Patient instructed to take medication on an empty stomach with a full glass of water.  Patient informed of potential adverse effects including but not limited to nausea, diarrhea, dizziness, itching, and swelling of the extremities or lymph nodes.  The patient verbalized understanding of the proper use and possible adverse effects of ivermectin.  All of the patient's questions and concerns were addressed. Azathioprine Counseling:  I discussed with the patient the risks of azathioprine including but not limited to myelosuppression, immunosuppression, hepatotoxicity, lymphoma, and infections.  The patient understands that monitoring is required including baseline LFTs, Creatinine, possible TPMP genotyping and weekly CBCs for the first month and then every 2 weeks thereafter.  The patient verbalized understanding of the proper use and possible adverse effects of azathioprine.  All of the patient's questions and concerns were addressed. Benzoyl Peroxide Counseling: Patient counseled that medicine may cause skin irritation and bleach clothing.  In the event of skin irritation, the patient was advised to reduce the amount of the drug applied or use it less frequently.   The patient verbalized understanding of the proper use and possible adverse effects of benzoyl peroxide.  All of the patient's questions and concerns were addressed. Fluconazole Pregnancy And Lactation Text: This medication is Pregnancy Category C and it isn't know if it is safe during pregnancy. It is also excreted in breast milk. Over the Counter Salicylic Acid Counseling: Over the counter salicylic acid preparations can be used effectively to treat warts at home. There are two types of application: liquid and plaster. Liquid preparations are applied like nail polish and the plaster applications are applied like a bandage (you may need to apply duct tape over the plaster to keep it in place). Dead and macerated skin should be removed regularly with a nail file or nail clippers for best results. Tranexamic Acid Counseling:  Patient advised of the small risk of bleeding problems with tranexamic acid. They were also instructed to call if they developed any nausea, vomiting or diarrhea. All of the patient's questions and concerns were addressed. Spevigo Counseling: I discussed with the patient the risks of Spevigo including but not limited to fatigue, nasuea, vomiting, headache, pruritus, urinary tract infection, an infusion related reactions.  The patient understands that monitoring is required including screening for tuberculosis at baseline and yearly screening thereafter while continuing Spevigo therapy. They should contact us if symptoms of infection or other concerning signs are noted. Topical Clindamycin Counseling: Patient counseled that this medication may cause skin irritation or allergic reactions.  In the event of skin irritation, the patient was advised to reduce the amount of the drug applied or use it less frequently.   The patient verbalized understanding of the proper use and possible adverse effects of clindamycin.  All of the patient's questions and concerns were addressed. Azithromycin Pregnancy And Lactation Text: This medication is considered safe during pregnancy and is also secreted in breast milk. Dapsone Pregnancy And Lactation Text: This medication is Pregnancy Category C and is not considered safe during pregnancy or breast feeding. Rinvoq Counseling: I discussed with the patient the risks of Rinvoq therapy including but not limited to upper respiratory tract infections, shingles, cold sores, bronchitis, nausea, cough, fever, acne, and headache. Live vaccines should be avoided.  This medication has been linked to serious infections; higher rate of mortality; malignancy and lymphoproliferative disorders; major adverse cardiovascular events; thrombosis; thrombocytopenia, anemia, and neutropenia; lipid elevations; liver enzyme elevations; and gastrointestinal perforations. Hyrimoz Counseling:  I discussed with the patient the risks of adalimumab including but not limited to myelosuppression, immunosuppression, autoimmune hepatitis, demyelinating diseases, lymphoma, and serious infections.  The patient understands that monitoring is required including a PPD at baseline and must alert us or the primary physician if symptoms of infection or other concerning signs are noted. Winlevi Counseling:  I discussed with the patient the risks of topical clascoterone including but not limited to erythema, scaling, itching, and stinging. Patient voiced their understanding. Oxybutynin Counseling:  I discussed with the patient the risks of oxybutynin including but not limited to skin rash, drowsiness, dry mouth, difficulty urinating, and blurred vision. Opioid Pregnancy And Lactation Text: These medications can lead to premature delivery and should be avoided during pregnancy. These medications are also present in breast milk in small amounts. Rinvoq Pregnancy And Lactation Text: Based on animal studies, Rinvoq may cause embryo-fetal harm when administered to pregnant women.  The medication should not be used in pregnancy.  Breastfeeding is not recommended during treatment and for 6 days after the last dose. Picato Counseling:  I discussed with the patient the risks of Picato including but not limited to erythema, scaling, itching, weeping, crusting, and pain. Sarecycline Counseling: Patient advised regarding possible photosensitivity and discoloration of the teeth, skin, lips, tongue and gums.  Patient instructed to avoid sunlight, if possible.  When exposed to sunlight, patients should wear protective clothing, sunglasses, and sunscreen.  The patient was instructed to call the office immediately if the following severe adverse effects occur:  hearing changes, easy bruising/bleeding, severe headache, or vision changes.  The patient verbalized understanding of the proper use and possible adverse effects of sarecycline.  All of the patient's questions and concerns were addressed. Prednisone Counseling:  I discussed with the patient the risks of prolonged use of prednisone including but not limited to weight gain, insomnia, osteoporosis, mood changes, diabetes, susceptibility to infection, glaucoma and high blood pressure.  In cases where prednisone use is prolonged, patients should be monitored with blood pressure checks, serum glucose levels and an eye exam.  Additionally, the patient may need to be placed on GI prophylaxis, PCP prophylaxis, and calcium and vitamin D supplementation and/or a bisphosphonate.  The patient verbalized understanding of the proper use and the possible adverse effects of prednisone.  All of the patient's questions and concerns were addressed. Prednisone Pregnancy And Lactation Text: This medication is Pregnancy Category C and it isn't know if it is safe during pregnancy. This medication is excreted in breast milk. Elidel Counseling: Patient may experience a mild burning sensation during topical application. Elidel is not approved in children less than 2 years of age. There have been case reports of hematologic and skin malignancies in patients using topical calcineurin inhibitors although causality is questionable. Bactrim Counseling:  I discussed with the patient the risks of sulfa antibiotics including but not limited to GI upset, allergic reaction, drug rash, diarrhea, dizziness, photosensitivity, and yeast infections.  Rarely, more serious reactions can occur including but not limited to aplastic anemia, agranulocytosis, methemoglobinemia, blood dyscrasias, liver or kidney failure, lung infiltrates or desquamative/blistering drug rashes. Spevigo Pregnancy And Lactation Text: The risk during pregnancy and breastfeeding is uncertain with this medication. This medication does cross the placenta. It is unknown if this medication is found in breast milk. Gabapentin Counseling: I discussed with the patient the risks of gabapentin including but not limited to dizziness, somnolence, fatigue and ataxia. Topical Clindamycin Pregnancy And Lactation Text: This medication is Pregnancy Category B and is considered safe during pregnancy. It is unknown if it is excreted in breast milk. Gabapentin Pregnancy And Lactation Text: This medication is Pregnancy Category C and isn't considered safe during pregnancy. It is excreted in breast milk. Adbry Counseling: I discussed with the patient the risks of tralokinumab including but not limited to eye infection and irritation, cold sores, injection site reactions, worsening of asthma, allergic reactions and increased risk of parasitic infection.  Live vaccines should be avoided while taking tralokinumab. The patient understands that monitoring is required and they must alert us or the primary physician if symptoms of infection or other concerning signs are noted. Ilumya Counseling: I discussed with the patient the risks of tildrakizumab including but not limited to immunosuppression, malignancy, posterior leukoencephalopathy syndrome, and serious infections.  The patient understands that monitoring is required including a PPD at baseline and must alert us or the primary physician if symptoms of infection or other concerning signs are noted. Bactrim Pregnancy And Lactation Text: This medication is Pregnancy Category D and is known to cause fetal risk.  It is also excreted in breast milk. Xeljanz Counseling: I discussed with the patient the risks of Xeljanz therapy including increased risk of infection, liver issues, headache, diarrhea, or cold symptoms. Live vaccines should be avoided. They were instructed to call if they have any problems. Sarecycline Pregnancy And Lactation Text: This medication is Pregnancy Category D and not consider safe during pregnancy. It is also excreted in breast milk. Winlevi Pregnancy And Lactation Text: This medication is considered safe during pregnancy and breastfeeding. Tetracycline Counseling: Patient counseled regarding possible photosensitivity and increased risk for sunburn.  Patient instructed to avoid sunlight, if possible.  When exposed to sunlight, patients should wear protective clothing, sunglasses, and sunscreen.  The patient was instructed to call the office immediately if the following severe adverse effects occur:  hearing changes, easy bruising/bleeding, severe headache, or vision changes.  The patient verbalized understanding of the proper use and possible adverse effects of tetracycline.  All of the patient's questions and concerns were addressed. Patient understands to avoid pregnancy while on therapy due to potential birth defects. VTAMA Counseling: I discussed with the patient that VTAMA is not for use in the eyes, mouth or mouth. They should call the office if they develop any signs of allergic reactions to VTAMA. The patient verbalized understanding of the proper use and possible adverse effects of VTAMA.  All of the patient's questions and concerns were addressed. Propranolol Counseling:  I discussed with the patient the risks of propranolol including but not limited to low heart rate, low blood pressure, low blood sugar, restlessness and increased cold sensitivity. They should call the office if they experience any of these side effects. Protopic Counseling: Patient may experience a mild burning sensation during topical application. Protopic is not approved in children less than 2 years of age. There have been case reports of hematologic and skin malignancies in patients using topical calcineurin inhibitors although causality is questionable. Aklief counseling:  Patient advised to apply a pea-sized amount only at bedtime and wait 30 minutes after washing their face before applying.  If too drying, patient may add a non-comedogenic moisturizer.  The most commonly reported side effects including irritation, redness, scaling, dryness, stinging, burning, itching, and increased risk of sunburn.  The patient verbalized understanding of the proper use and possible adverse effects of retinoids.  All of the patient's questions and concerns were addressed. Topical Ketoconazole Counseling: Patient counseled that this medication may cause skin irritation or allergic reactions.  In the event of skin irritation, the patient was advised to reduce the amount of the drug applied or use it less frequently.   The patient verbalized understanding of the proper use and possible adverse effects of ketoconazole.  All of the patient's questions and concerns were addressed. Stelara Counseling:  I discussed with the patient the risks of ustekinumab including but not limited to immunosuppression, malignancy, posterior leukoencephalopathy syndrome, and serious infections.  The patient understands that monitoring is required including a PPD at baseline and must alert us or the primary physician if symptoms of infection or other concerning signs are noted. Adbry Pregnancy And Lactation Text: It is unknown if this medication will adversely affect pregnancy or breast feeding. Eucrisa Counseling: Patient may experience a mild burning sensation during topical application. Eucrisa is not approved in children less than 2 years of age. Glycopyrrolate Counseling:  I discussed with the patient the risks of glycopyrrolate including but not limited to skin rash, drowsiness, dry mouth, difficulty urinating, and blurred vision. Xelakuaz Pregnancy And Lactation Text: This medication is Pregnancy Category D and is not considered safe during pregnancy.  The risk during breast feeding is also uncertain. Cephalexin Counseling: I counseled the patient regarding use of cephalexin as an antibiotic for prophylactic and/or therapeutic purposes. Cephalexin (commonly prescribed under brand name Keflex) is a cephalosporin antibiotic which is active against numerous classes of bacteria, including most skin bacteria. Side effects may include nausea, diarrhea, gastrointestinal upset, rash, hives, yeast infections, and in rare cases, hepatitis, kidney disease, seizures, fever, confusion, neurologic symptoms, and others. Patients with severe allergies to penicillin medications are cautioned that there is about a 10% incidence of cross-reactivity with cephalosporins. When possible, patients with penicillin allergies should use alternatives to cephalosporins for antibiotic therapy. Protopic Pregnancy And Lactation Text: This medication is Pregnancy Category C. It is unknown if this medication is excreted in breast milk when applied topically. Propranolol Pregnancy And Lactation Text: This medication is Pregnancy Category C and it isn't known if it is safe during pregnancy. It is excreted in breast milk. Aklief Pregnancy And Lactation Text: It is unknown if this medication is safe to use during pregnancy.  It is unknown if this medication is excreted in breast milk.  Breastfeeding women should use the topical cream on the smallest area of the skin for the shortest time needed while breastfeeding.  Do not apply to nipple and areola. Infliximab Counseling:  I discussed with the patient the risks of infliximab including but not limited to myelosuppression, immunosuppression, autoimmune hepatitis, demyelinating diseases, lymphoma, and serious infections.  The patient understands that monitoring is required including a PPD at baseline and must alert us or the primary physician if symptoms of infection or other concerning signs are noted. Glycopyrrolate Pregnancy And Lactation Text: This medication is Pregnancy Category B and is considered safe during pregnancy. It is unknown if it is excreted breast milk. Amzeeq Counseling: Amzeeq is a topical antibiotic foam which contains minocycline.  The most commonly reported side effect of Amzeeq is headache.  The medication is flammable and should not be applied near a fire, flame, or while smoking.  Sun precautions is recommended to prevent sunburn. Taltz Counseling: I discussed with the patient the risks of ixekizumab including but not limited to immunosuppression, serious infections, worsening of inflammatory bowel disease and drug reactions.  The patient understands that monitoring is required including a PPD at baseline and must alert us or the primary physician if symptoms of infection or other concerning signs are noted. Topical Metronidazole Counseling: Metronidazole is a topical antibiotic medication. You may experience burning, stinging, redness, or allergic reactions.  Please call our office if you develop any problems from using this medication. Bimzelx Counseling:  I discussed with the patient the risks of Bimzelx including but not limited to depression, immunosuppression, allergic reactions and infections.  The patient understands that monitoring is required including a PPD at baseline and must alert us or the primary physician if symptoms of infection or other concerning signs are noted. Cephalexin Pregnancy And Lactation Text: This medication is Pregnancy Category B and considered safe during pregnancy.  It is also excreted in breast milk but can be used safely for shorter doses. High Dose Vitamin A Pregnancy And Lactation Text: High dose vitamin A therapy is contraindicated during pregnancy and breast feeding. Olanzapine Pregnancy And Lactation Text: This medication is pregnancy category C.   There are no adequate and well controlled trials with olanzapine in pregnant females.  Olanzapine should be used during pregnancy only if the potential benefit justifies the potential risk to the fetus.   In a study in lactating healthy women, olanzapine was excreted in breast milk.  It is recommended that women taking olanzapine should not breast feed. Spironolactone Counseling: Patient advised regarding risks of diarrhea, abdominal pain, hyperkalemia, birth defects (for female patients), liver toxicity and renal toxicity. The patient may need blood work to monitor liver and kidney function and potassium levels while on therapy. The patient verbalized understanding of the proper use and possible adverse effects of spironolactone.  All of the patient's questions and concerns were addressed. Litfulo Counseling: I discussed with the patient the risks of Litfulo therapy including but not limited to upper respiratory tract infections, shingles, cold sores, and nausea. Live vaccines should be avoided.  This medication has been linked to serious infections; higher rate of mortality; malignancy and lymphoproliferative disorders; major adverse cardiovascular events; thrombosis; gastrointestinal perforations; neutropenia; lymphopenia; anemia; liver enzyme elevations; and lipid elevations. Fluconazole Counseling:  Patient counseled regarding adverse effects of fluconazole including but not limited to headache, diarrhea, nausea, upset stomach, liver function test abnormalities, taste disturbance, and stomach pain.  There is a rare possibility of liver failure that can occur when taking fluconazole.  The patient understands that monitoring of LFTs and kidney function test may be required, especially at baseline. The patient verbalized understanding of the proper use and possible adverse effects of fluconazole.  All of the patient's questions and concerns were addressed. Cyclophosphamide Pregnancy And Lactation Text: This medication is Pregnancy Category D and it isn't considered safe during pregnancy. This medication is excreted in breast milk. Ketoconazole Counseling:   Patient counseled regarding improving absorption with orange juice.  Adverse effects include but are not limited to breast enlargement, headache, diarrhea, nausea, upset stomach, liver function test abnormalities, taste disturbance, and stomach pain.  There is a rare possibility of liver failure that can occur when taking ketoconazole. The patient understands that monitoring of LFTs may be required, especially at baseline. The patient verbalized understanding of the proper use and possible adverse effects of ketoconazole.  All of the patient's questions and concerns were addressed. Ketoconazole Pregnancy And Lactation Text: This medication is Pregnancy Category C and it isn't know if it is safe during pregnancy. It is also excreted in breast milk and breast feeding isn't recommended. Sotyktu Pregnancy And Lactation Text: There is insufficient data to evaluate whether or not Sotyktu is safe to use during pregnancy.? ?It is not known if Sotyktu passes into breast milk and whether or not it is safe to use when breastfeeding.?? Topical Retinoid counseling:  Patient advised to apply a pea-sized amount only at bedtime and wait 30 minutes after washing their face before applying.  If too drying, patient may add a non-comedogenic moisturizer. The patient verbalized understanding of the proper use and possible adverse effects of retinoids.  All of the patient's questions and concerns were addressed. Zepbound Counseling: I reviewed the possible side effects including: thyroid tumors, kidney disease, gallbladder disease, abdominal pain, constipation, diarrhea, nausea, vomiting and pancreatitis. Do not take this medication if you have a history or family history of multiple endocrine neoplasia syndrome type 2. Side effects reviewed, pt to contact office should one occur. Simponi Counseling:  I discussed with the patient the risks of golimumab including but not limited to myelosuppression, immunosuppression, autoimmune hepatitis, demyelinating diseases, lymphoma, and serious infections.  The patient understands that monitoring is required including a PPD at baseline and must alert us or the primary physician if symptoms of infection or other concerning signs are noted. Cantharidin Counseling:  I discussed with the patient the risks of Cantharidin including but not limited to pain, redness, burning, itching, and blistering. Include Pregnancy/Lactation Warning?: Add Automatically Based on Childbearing Potential and Patient Age Hydroxyzine Pregnancy And Lactation Text: This medication is not safe during pregnancy and should not be taken. It is also excreted in breast milk and breast feeding isn't recommended. Enbrel Counseling:  I discussed with the patient the risks of etanercept including but not limited to myelosuppression, immunosuppression, autoimmune hepatitis, demyelinating diseases, lymphoma, and infections.  The patient understands that monitoring is required including a PPD at baseline and must alert us or the primary physician if symptoms of infection or other concerning signs are noted. Oral Minoxidil Counseling- I discussed with the patient the risks of oral minoxidil including but not limited to shortness of breath, swelling of the feet or ankles, dizziness, lightheadedness, unwanted hair growth and allergic reaction.  The patient verbalized understanding of the proper use and possible adverse effects of oral minoxidil.  All of the patient's questions and concerns were addressed. Spironolactone Pregnancy And Lactation Text: This medication can cause feminization of the male fetus and should be avoided during pregnancy. The active metabolite is also found in breast milk. Litfulo Pregnancy And Lactation Text: Based on animal studies, Lifulo may cause embryo-fetal harm when administered to pregnant women.  The medication should not be used in pregnancy.  Breastfeeding is not recommended during treatment. Mirvaso Counseling: Mirvaso is a topical medication which can decrease superficial blood flow where applied. Side effects are uncommon and include stinging, redness and allergic reactions. Quinolones Counseling:  I discussed with the patient the risks of fluoroquinolones including but not limited to GI upset, allergic reaction, drug rash, diarrhea, dizziness, photosensitivity, yeast infections, liver function test abnormalities, tendonitis/tendon rupture. Cyclosporine Counseling:  I discussed with the patient the risks of cyclosporine including but not limited to hypertension, gingival hyperplasia,myelosuppression, immunosuppression, liver damage, kidney damage, neurotoxicity, lymphoma, and serious infections. The patient understands that monitoring is required including baseline blood pressure, CBC, CMP, lipid panel and uric acid, and then 1-2 times monthly CMP and blood pressure. Colchicine Counseling:  Patient counseled regarding adverse effects including but not limited to stomach upset (nausea, vomiting, stomach pain, or diarrhea).  Patient instructed to limit alcohol consumption while taking this medication.  Colchicine may reduce blood counts especially with prolonged use.  The patient understands that monitoring of kidney function and blood counts may be required, especially at baseline. The patient verbalized understanding of the proper use and possible adverse effects of colchicine.  All of the patient's questions and concerns were addressed. Terbinafine Counseling: Patient counseling regarding adverse effects of terbinafine including but not limited to headache, diarrhea, rash, upset stomach, liver function test abnormalities, itching, taste/smell disturbance, nausea, abdominal pain, and flatulence.  There is a rare possibility of liver failure that can occur when taking terbinafine.  The patient understands that a baseline LFT and kidney function test may be required. The patient verbalized understanding of the proper use and possible adverse effects of terbinafine.  All of the patient's questions and concerns were addressed. 5-Fu Counseling: 5-Fluorouracil Counseling:  I discussed with the patient the risks of 5-fluorouracil including but not limited to erythema, scaling, itching, weeping, crusting, and pain. Humira Counseling:  I discussed with the patient the risks of adalimumab including but not limited to myelosuppression, immunosuppression, autoimmune hepatitis, demyelinating diseases, lymphoma, and serious infections.  The patient understands that monitoring is required including a PPD at baseline and must alert us or the primary physician if symptoms of infection or other concerning signs are noted. Skyrizi Counseling: I discussed with the patient the risks of risankizumab-rzaa including but not limited to immunosuppression, and serious infections.  The patient understands that monitoring is required including a PPD at baseline and must alert us or the primary physician if symptoms of infection or other concerning signs are noted. Olumiant Counseling: I discussed with the patient the risks of Olumiant therapy including but not limited to upper respiratory tract infections, shingles, cold sores, and nausea. Live vaccines should be avoided.  This medication has been linked to serious infections; higher rate of mortality; malignancy and lymphoproliferative disorders; major adverse cardiovascular events; thrombosis; gastrointestinal perforations; neutropenia; lymphopenia; anemia; liver enzyme elevations; and lipid elevations. Xolair Pregnancy And Lactation Text: This medication is Pregnancy Category B and is considered safe during pregnancy. This medication is excreted in breast milk. Oral Minoxidil Pregnancy And Lactation Text: This medication should only be used when clearly needed if you are pregnant, attempting to become pregnant or breast feeding. Rifampin Counseling: I discussed with the patient the risks of rifampin including but not limited to liver damage, kidney damage, red-orange body fluids, nausea/vomiting and severe allergy. Methotrexate Counseling:  Patient counseled regarding adverse effects of methotrexate including but not limited to nausea, vomiting, abnormalities in liver function tests. Patients may develop mouth sores, rash, diarrhea, and abnormalities in blood counts. The patient understands that monitoring is required including LFT's and blood counts.  There is a rare possibility of scarring of the liver and lung problems that can occur when taking methotrexate. Persistent nausea, loss of appetite, pale stools, dark urine, cough, and shortness of breath should be reported immediately. Patient advised to discontinue methotrexate treatment at least three months before attempting to become pregnant.  I discussed the need for folate supplements while taking methotrexate.  These supplements can decrease side effects during methotrexate treatment. The patient verbalized understanding of the proper use and possible adverse effects of methotrexate.  All of the patient's questions and concerns were addressed. Otezla Counseling: The side effects of Otezla were discussed with the patient, including but not limited to worsening or new depression, weight loss, diarrhea, nausea, upper respiratory tract infection, and headache. Patient instructed to call the office should any adverse effect occur.  The patient verbalized understanding of the proper use and possible adverse effects of Otezla.  All the patient's questions and concerns were addressed. Terbinafine Pregnancy And Lactation Text: This medication is Pregnancy Category B and is considered safe during pregnancy. It is also excreted in breast milk and breast feeding isn't recommended. Tazorac Counseling:  Patient advised that medication is irritating and drying.  Patient may need to apply sparingly and wash off after an hour before eventually leaving it on overnight.  The patient verbalized understanding of the proper use and possible adverse effects of tazorac.  All of the patient's questions and concerns were addressed. 5-Fu Pregnancy And Lactation Text: This medication is Pregnancy Category X and contraindicated in pregnancy and in women who may become pregnant. It is unknown if this medication is excreted in breast milk. Dapsone Counseling: I discussed with the patient the risks of dapsone including but not limited to hemolytic anemia, agranulocytosis, rashes, methemoglobinemia, kidney failure, peripheral neuropathy, headaches, GI upset, and liver toxicity.  Patients who start dapsone require monitoring including baseline LFTs and weekly CBCs for the first month, then every month thereafter.  The patient verbalized understanding of the proper use and possible adverse effects of dapsone.  All of the patient's questions and concerns were addressed. Drysol Counseling:  I discussed with the patient the risks of drysol/aluminum chloride including but not limited to skin rash, itching, irritation, burning. Tazorac Pregnancy And Lactation Text: This medication is not safe during pregnancy. It is unknown if this medication is excreted in breast milk. Olumiant Pregnancy And Lactation Text: Based on animal studies, Olumiant may cause embryo-fetal harm when administered to pregnant women.  The medication should not be used in pregnancy.  Breastfeeding is not recommended during treatment. Azithromycin Counseling:  I discussed with the patient the risks of azithromycin including but not limited to GI upset, allergic reaction, drug rash, diarrhea, and yeast infections. Opzelura Counseling:  I discussed with the patient the risks of Opzelura including but not limited to nasopharngitis, bronchitis, ear infection, eosinophila, hives, diarrhea, folliculitis, tonsillitis, and rhinorrhea.  Taken orally, this medication has been linked to serious infections; higher rate of mortality; malignancy and lymphoproliferative disorders; major adverse cardiovascular events; thrombosis; thrombocytopenia, anemia, and neutropenia; and lipid elevations. Otezla Pregnancy And Lactation Text: This medication is Pregnancy Category C and it isn't known if it is safe during pregnancy. It is unknown if it is excreted in breast milk. Opzelura Pregnancy And Lactation Text: There is insufficient data to evaluate drug-associated risk for major birth defects, miscarriage, or other adverse maternal or fetal outcomes.  There is a pregnancy registry that monitors pregnancy outcomes in pregnant persons exposed to the medication during pregnancy.  It is unknown if this medication is excreted in breast milk.  Do not breastfeed during treatment and for about 4 weeks after the last dose. Opioid Counseling: I discussed with the patient the potential side effects of opioids including but not limited to addiction, altered mental status, and depression. I stressed avoiding alcohol, benzodiazepines, muscle relaxants and sleep aids unless specifically okayed by a physician. The patient verbalized understanding of the proper use and possible adverse effects of opioids. All of the patient's questions and concerns were addressed. They were instructed to flush the remaining pills down the toilet if they did not need them for pain. Methotrexate Pregnancy And Lactation Text: This medication is Pregnancy Category X and is known to cause fetal harm. This medication is excreted in breast milk. Rifampin Pregnancy And Lactation Text: This medication is Pregnancy Category C and it isn't know if it is safe during pregnancy. It is also excreted in breast milk and should not be used if you are breast feeding. Tranexamic Acid Pregnancy And Lactation Text: It is unknown if this medication is safe during pregnancy or breast feeding. Griseofulvin Counseling:  I discussed with the patient the risks of griseofulvin including but not limited to photosensitivity, cytopenia, liver damage, nausea/vomiting and severe allergy.  The patient understands that this medication is best absorbed when taken with a fatty meal (e.g., ice cream or french fries). Benzoyl Peroxide Pregnancy And Lactation Text: This medication is Pregnancy Category C. It is unknown if benzoyl peroxide is excreted in breast milk. Rituxan Pregnancy And Lactation Text: This medication is Pregnancy Category C and it isn't know if it is safe during pregnancy. It is unknown if this medication is excreted in breast milk but similar antibodies are known to be excreted. Klisyri Counseling:  I discussed with the patient the risks of Klisyri including but not limited to erythema, scaling, itching, weeping, crusting, and pain. Topical Sulfur Applications Pregnancy And Lactation Text: This medication is considered safe during pregnancy and breast feeding secondary to limited systemic absorption. Cimetidine Counseling:  I discussed with the patient the risks of Cimetidine including but not limited to gynecomastia, headache, diarrhea, nausea, drowsiness, arrhythmias, pancreatitis, skin rashes, psychosis, bone marrow suppression and kidney toxicity. Niacinamide Counseling: I recommended taking niacin or niacinamide, also know as vitamin B3, twice daily. Recent evidence suggests that taking vitamin B3 (500 mg twice daily) can reduce the risk of actinic keratoses and non-melanoma skin cancers. Side effects of vitamin B3 include flushing and headache. Siliq Counseling:  I discussed with the patient the risks of Siliq including but not limited to new or worsening depression, suicidal thoughts and behavior, immunosuppression, malignancy, posterior leukoencephalopathy syndrome, and serious infections.  The patient understands that monitoring is required including a PPD at baseline and must alert us or the primary physician if symptoms of infection or other concerning signs are noted. There is also a special program designed to monitor depression which is required with Siliq. Erythromycin Pregnancy And Lactation Text: This medication is Pregnancy Category B and is considered safe during pregnancy. It is also excreted in breast milk. Niacinamide Pregnancy And Lactation Text: These medications are considered safe during pregnancy. Wartpeel Counseling:  I discussed with the patient the risks of Wartpeel including but not limited to erythema, scaling, itching, weeping, crusting, and pain. Finasteride Female Counseling: Finasteride Counseling:  I discussed with the patient the risks of use of finasteride including but not limited to decreased libido and sexual dysfunction. Explained the teratogenic nature of the medication and stressed the importance of not getting pregnant during treatment. All of the patient's questions and concerns were addressed. Klisyri Pregnancy And Lactation Text: It is unknown if this medication can harm a developing fetus or if it is excreted in breast milk. Bexarotene Pregnancy And Lactation Text: This medication is Pregnancy Category X and should not be given to women who are pregnant or may become pregnant. This medication should not be used if you are breast feeding. Over The Counter Salicylic Acid Pregnancy And Lactation Text: It is not recommended to use high dose OTC salicylic acid while pregnant. Lower dose topical preparations are considered safe. Azathioprine Pregnancy And Lactation Text: This medication is Pregnancy Category D and isn't considered safe during pregnancy. It is unknown if this medication is excreted in breast milk. Valtrex Counseling: I discussed with the patient the risks of valacyclovir including but not limited to kidney damage, nausea, vomiting and severe allergy.  The patient understands that if the infection seems to be worsening or is not improving, they are to call. Libtayo Counseling- I discussed with the patient the risks of Libtayo including but not limited to nausea, vomiting, diarrhea, and bone or muscle pain.  The patient verbalized understanding of the proper use and possible adverse effects of Libtayo.  All of the patient's questions and concerns were addressed. Solaraze Counseling:  I discussed with the patient the risks of Solaraze including but not limited to erythema, scaling, itching, weeping, crusting, and pain. Semaglutide Counseling: I reviewed the possible side effects including: thyroid tumors, kidney disease, gallbladder disease, abdominal pain, constipation, diarrhea, nausea, vomiting and pancreatitis. Do not take this medication if you have a history or family history of multiple endocrine neoplasia syndrome type 2. Side effects reviewed, pt to contact office should one occur. Cellcept Counseling:  I discussed with the patient the risks of mycophenolate mofetil including but not limited to infection/immunosuppression, GI upset, hypokalemia, hypercholesterolemia, bone marrow suppression, lymphoproliferative disorders, malignancy, GI ulceration/bleed/perforation, colitis, interstitial lung disease, kidney failure, progressive multifocal leukoencephalopathy, and birth defects.  The patient understands that monitoring is required including a baseline creatinine and regular CBC testing. In addition, patient must alert us immediately if symptoms of infection or other concerning signs are noted. Carac Counseling:  I discussed with the patient the risks of Carac including but not limited to erythema, scaling, itching, weeping, crusting, and pain. Griseofulvin Pregnancy And Lactation Text: This medication is Pregnancy Category X and is known to cause serious birth defects. It is unknown if this medication is excreted in breast milk but breast feeding should be avoided. Dupixent Counseling: I discussed with the patient the risks of dupilumab including but not limited to eye infection and irritation, cold sores, injection site reactions, worsening of asthma, allergic reactions and increased risk of parasitic infection.  Live vaccines should be avoided while taking dupilumab. Dupilumab will also interact with certain medications such as warfarin and cyclosporine. The patient understands that monitoring is required and they must alert us or the primary physician if symptoms of infection or other concerning signs are noted. Dupixent Pregnancy And Lactation Text: This medication likely crosses the placenta but the risk for the fetus is uncertain. This medication is excreted in breast milk. Finasteride Pregnancy And Lactation Text: This medication is absolutely contraindicated during pregnancy. It is unknown if it is excreted in breast milk. Doxepin Counseling:  Patient advised that the medication is sedating and not to drive a car after taking this medication. Patient informed of potential adverse effects including but not limited to dry mouth, urinary retention, and blurry vision.  The patient verbalized understanding of the proper use and possible adverse effects of doxepin.  All of the patient's questions and concerns were addressed. Latisse Counseling: Lattise Counseling: I reviewed the possible side-effects of Latisse including itching, eye irritation, discoloration and exacerbating glaucoma. I also discussed application methods. Isotretinoin Counseling: Patient should get monthly blood tests, not donate blood, not drive at night if vision affected, not share medication, and not undergo elective surgery for 6 months after tx completed. Side effects reviewed, pt to contact office should one occur. Nsaids Counseling: NSAID Counseling: I discussed with the patient that NSAIDs should be taken with food. Prolonged use of NSAIDs can result in the development of stomach ulcers.  Patient advised to stop taking NSAIDs if abdominal pain occurs.  The patient verbalized understanding of the proper use and possible adverse effects of NSAIDs.  All of the patient's questions and concerns were addressed. Metronidazole Counseling:  I discussed with the patient the risks of metronidazole including but not limited to seizures, nausea/vomiting, a metallic taste in the mouth, nausea/vomiting and severe allergy. Arava Counseling:  Patient counseled regarding adverse effects of Arava including but not limited to nausea, vomiting, abnormalities in liver function tests. Patients may develop mouth sores, rash, diarrhea, and abnormalities in blood counts. The patient understands that monitoring is required including LFTs and blood counts.  There is a rare possibility of scarring of the liver and lung problems that can occur when taking methotrexate. Persistent nausea, loss of appetite, pale stools, dark urine, cough, and shortness of breath should be reported immediately. Patient advised to discontinue Arava treatment and consult with a physician prior to attempting conception. The patient will have to undergo a treatment to eliminate Arava from the body prior to conception. Valtrex Pregnancy And Lactation Text: this medication is Pregnancy Category B and is considered safe during pregnancy. This medication is not directly found in breast milk but it's metabolite acyclovir is present. Libtayo Pregnancy And Lactation Text: This medication is contraindicated in pregnancy and when breast feeding. Solaraze Pregnancy And Lactation Text: This medication is Pregnancy Category B and is considered safe. There is some data to suggest avoiding during the third trimester. It is unknown if this medication is excreted in breast milk. Itraconazole Counseling:  I discussed with the patient the risks of itraconazole including but not limited to liver damage, nausea/vomiting, neuropathy, and severe allergy.  The patient understands that this medication is best absorbed when taken with acidic beverages such as non-diet cola or ginger ale.  The patient understands that monitoring is required including baseline LFTs and repeat LFTs at intervals.  The patient understands that they are to contact us or the primary physician if concerning signs are noted. Ebglyss Counseling: I discussed with the patient the risks of lebrikizumab including but not limited to eye inflammation and irritation, cold sores, injection site reactions, allergic reactions and increased risk of parasitic infection. The patient understands that monitoring is required and they must alert us or the primary physician if symptoms of infection or other concerning signs are noted. Odomzo Counseling- I discussed with the patient the risks of Odomzo including but not limited to nausea, vomiting, diarrhea, constipation, weight loss, changes in the sense of taste, decreased appetite, muscle spasms, and hair loss.  The patient verbalized understanding of the proper use and possible adverse effects of Odomzo.  All of the patient's questions and concerns were addressed. Simlandi Counseling:  I discussed with the patient the risks of adalimumab including but not limited to myelosuppression, immunosuppression, autoimmune hepatitis, demyelinating diseases, lymphoma, and serious infections.  The patient understands that monitoring is required including a PPD at baseline and must alert us or the primary physician if symptoms of infection or other concerning signs are noted. Cibinqo Counseling: I discussed with the patient the risks of Cibinqo therapy including but not limited to common cold, nausea, headache, cold sores, increased blood CPK levels, dizziness, UTIs, fatigue, acne, and vomitting. Live vaccines should be avoided.  This medication has been linked to serious infections; higher rate of mortality; malignancy and lymphoproliferative disorders; major adverse cardiovascular events; thrombosis; thrombocytopenia and lymphopenia; lipid elevations; and retinal detachment. Doxepin Pregnancy And Lactation Text: This medication is Pregnancy Category C and it isn't known if it is safe during pregnancy. It is also excreted in breast milk and breast feeding isn't recommended. Latisse Pregnancy And Lactation Text: It is not recommended to use Latisse if you are pregnant or trying to become pregnant. Metronidazole Pregnancy And Lactation Text: This medication is Pregnancy Category B and considered safe during pregnancy.  It is also excreted in breast milk. Isotretinoin Pregnancy And Lactation Text: This medication is Pregnancy Category X and is considered extremely dangerous during pregnancy. It is unknown if it is excreted in breast milk. Nsaids Pregnancy And Lactation Text: These medications are considered safe up to 30 weeks gestation. It is excreted in breast milk. Birth Control Pills Counseling: Birth Control Pill Counseling: I discussed with the patient the potential side effects of OCPs including but not limited to increased risk of stroke, heart attack, thrombophlebitis, deep venous thrombosis, hepatic adenomas, breast changes, GI upset, headaches, and depression.  The patient verbalized understanding of the proper use and possible adverse effects of OCPs. All of the patient's questions and concerns were addressed. Birth Control Pills Pregnancy And Lactation Text: This medication should be avoided if pregnant and for the first 30 days post-partum. Minocycline Counseling: Patient advised regarding possible photosensitivity and discoloration of the teeth, skin, lips, tongue and gums.  Patient instructed to avoid sunlight, if possible.  When exposed to sunlight, patients should wear protective clothing, sunglasses, and sunscreen.  The patient was instructed to call the office immediately if the following severe adverse effects occur:  hearing changes, easy bruising/bleeding, severe headache, or vision changes.  The patient verbalized understanding of the proper use and possible adverse effects of minocycline.  All of the patient's questions and concerns were addressed. Wegovy Counseling: I reviewed the possible side effects including: thyroid tumors, kidney disease, gallbladder disease, abdominal pain, constipation, diarrhea, nausea, vomiting and pancreatitis. Do not take this medication if you have a history or family history of multiple endocrine neoplasia syndrome type 2. Side effects reviewed, pt to contact office should one occur. Cyclophosphamide Counseling:  I discussed with the patient the risks of cyclophosphamide including but not limited to hair loss, hormonal abnormalities, decreased fertility, abdominal pain, diarrhea, nausea and vomiting, bone marrow suppression and infection. The patient understands that monitoring is required while taking this medication. Calcipotriene Counseling:  I discussed with the patient the risks of calcipotriene including but not limited to erythema, scaling, itching, and irritation. Soolantra Counseling: I discussed with the patients the risks of topial Soolantra. This is a medicine which decreases the number of mites and inflammation in the skin. You experience burning, stinging, eye irritation or allergic reactions.  Please call our office if you develop any problems from using this medication. Use Enhanced Medication Counseling?: No Sotyktu Counseling:  I discussed the most common side effects of Sotyktu including: common cold, sore throat, sinus infections, cold sores, canker sores, folliculitis, and acne.? I also discussed more serious side effects of Sotyktu including but not limited to: serious allergic reactions; increased risk for infections such as TB; cancers such as lymphomas; rhabdomyolysis and elevated CPK; and elevated triglycerides and liver enzymes.? Clofazimine Counseling:  I discussed with the patient the risks of clofazimine including but not limited to skin and eye pigmentation, liver damage, nausea/vomiting, gastrointestinal bleeding and allergy. Calcipotriene Pregnancy And Lactation Text: The use of this medication during pregnancy or lactation is not recommended as there is insufficient data. Soolantra Pregnancy And Lactation Text: This medication is Pregnancy Category C. This medication is considered safe during breast feeding. Ebglyss Pregnancy And Lactation Text: This medication likely crosses the placenta but the risk for the fetus is uncertain. It is unknown if this medication is excreted in breast milk. Detail Level: Simple Cibinqo Pregnancy And Lactation Text: It is unknown if this medication will adversely affect pregnancy or breast feeding.  You should not take this medication if you are currently pregnant or planning a pregnancy or while breastfeeding. Hydroxyzine Counseling: Patient advised that the medication is sedating and not to drive a car after taking this medication.  Patient informed of potential adverse effects including but not limited to dry mouth, urinary retention, and blurry vision.  The patient verbalized understanding of the proper use and possible adverse effects of hydroxyzine.  All of the patient's questions and concerns were addressed. Minoxidil Counseling: Minoxidil is a topical medication which can increase blood flow where it is applied. It is uncertain how this medication increases hair growth. Side effects are uncommon and include stinging and allergic reactions. High Dose Vitamin A Counseling: Side effects reviewed, pt to contact office should one occur. Olanzapine Counseling- I discussed with the patient the common side effects of olanzapine including but are not limited to: lack of energy, dry mouth, increased appetite, sleepiness, tremor, constipation, dizziness, changes in behavior, or restlessness.  Explained that teenagers are more likely to experience headaches, abdominal pain, pain in the arms or legs, tiredness, and sleepiness.  Serious side effects include but are not limited: increased risk of death in elderly patients who are confused, have memory loss, or dementia-related psychosis; hyperglycemia; increased cholesterol and triglycerides; and weight gain.